# Patient Record
Sex: FEMALE | Race: BLACK OR AFRICAN AMERICAN | NOT HISPANIC OR LATINO | Employment: FULL TIME | URBAN - METROPOLITAN AREA
[De-identification: names, ages, dates, MRNs, and addresses within clinical notes are randomized per-mention and may not be internally consistent; named-entity substitution may affect disease eponyms.]

---

## 2023-03-13 PROBLEM — O99.613 CONSTIPATION DURING PREGNANCY IN THIRD TRIMESTER (HHS-HCC): Status: ACTIVE | Noted: 2023-03-13

## 2023-03-13 PROBLEM — K59.00 CONSTIPATION DURING PREGNANCY IN THIRD TRIMESTER (HHS-HCC): Status: ACTIVE | Noted: 2023-03-13

## 2023-03-13 PROBLEM — R63.5 WEIGHT GAIN: Status: ACTIVE | Noted: 2023-03-13

## 2023-03-13 PROBLEM — N64.4 BREAST PAIN, LEFT: Status: ACTIVE | Noted: 2023-03-13

## 2023-03-13 PROBLEM — O99.019 ANEMIA IN PREGNANCY (HHS-HCC): Status: ACTIVE | Noted: 2023-03-13

## 2023-03-13 PROBLEM — R11.0 NAUSEA IN ADULT: Status: ACTIVE | Noted: 2023-03-13

## 2023-03-13 PROBLEM — G89.29 CHRONIC RIGHT-SIDED LOW BACK PAIN WITHOUT SCIATICA: Status: ACTIVE | Noted: 2023-03-13

## 2023-03-13 PROBLEM — K80.20 CHOLELITHIASIS: Status: ACTIVE | Noted: 2023-03-13

## 2023-03-13 PROBLEM — M54.2 NECK PAIN: Status: ACTIVE | Noted: 2023-03-13

## 2023-03-13 PROBLEM — O35.8XX0 FAMILY HISTORIC RISK OF CONGENITAL ABNORMALITY (HHS-HCC): Status: ACTIVE | Noted: 2023-03-13

## 2023-03-13 PROBLEM — N91.2 AMENORRHEA: Status: ACTIVE | Noted: 2023-03-13

## 2023-03-13 PROBLEM — O21.9 NAUSEA AND VOMITING DURING PREGNANCY (HHS-HCC): Status: ACTIVE | Noted: 2023-03-13

## 2023-03-13 PROBLEM — O99.891 BACK PAIN IN PREGNANCY (HHS-HCC): Status: ACTIVE | Noted: 2023-03-13

## 2023-03-13 PROBLEM — J02.9 SORE THROAT: Status: ACTIVE | Noted: 2023-03-13

## 2023-03-13 PROBLEM — N63.10 BREAST MASS, RIGHT: Status: ACTIVE | Noted: 2023-03-13

## 2023-03-13 PROBLEM — N63.0 BREAST MASS IN FEMALE: Status: ACTIVE | Noted: 2023-03-13

## 2023-03-13 PROBLEM — M54.50 CHRONIC RIGHT-SIDED LOW BACK PAIN WITHOUT SCIATICA: Status: ACTIVE | Noted: 2023-03-13

## 2023-03-13 PROBLEM — O99.013 ANEMIA AFFECTING PREGNANCY IN THIRD TRIMESTER (HHS-HCC): Status: ACTIVE | Noted: 2023-03-13

## 2023-03-13 PROBLEM — R76.11 REACTION TO TUBERCULIN SKIN TEST WITHOUT ACTIVE TUBERCULOSIS: Status: ACTIVE | Noted: 2023-03-13

## 2023-03-13 PROBLEM — M54.9 BACK PAIN IN PREGNANCY (HHS-HCC): Status: ACTIVE | Noted: 2023-03-13

## 2023-03-13 PROBLEM — M25.511 RIGHT SHOULDER PAIN: Status: ACTIVE | Noted: 2023-03-13

## 2023-03-13 PROBLEM — O99.611 CONSTIPATION DURING PREGNANCY IN FIRST TRIMESTER (HHS-HCC): Status: ACTIVE | Noted: 2023-03-13

## 2023-03-13 PROBLEM — K59.00 CONSTIPATION DURING PREGNANCY IN FIRST TRIMESTER (HHS-HCC): Status: ACTIVE | Noted: 2023-03-13

## 2023-03-13 PROBLEM — M54.2 CERVICALGIA: Status: ACTIVE | Noted: 2023-03-13

## 2023-03-13 RX ORDER — SEMAGLUTIDE 1.34 MG/ML
0.25 INJECTION, SOLUTION SUBCUTANEOUS
COMMUNITY
Start: 2022-08-24 | End: 2023-03-17

## 2023-03-13 RX ORDER — ALBUTEROL SULFATE 90 UG/1
2 AEROSOL, METERED RESPIRATORY (INHALATION) EVERY 4 HOURS PRN
COMMUNITY
End: 2024-05-30 | Stop reason: WASHOUT

## 2023-03-13 RX ORDER — NORETHINDRONE 0.35 MG/1
1 TABLET ORAL DAILY
COMMUNITY
Start: 2020-12-21 | End: 2024-05-30 | Stop reason: WASHOUT

## 2023-03-17 ENCOUNTER — OFFICE VISIT (OUTPATIENT)
Dept: PRIMARY CARE | Facility: CLINIC | Age: 23
End: 2023-03-17
Payer: COMMERCIAL

## 2023-03-17 VITALS
HEART RATE: 85 BPM | WEIGHT: 201 LBS | DIASTOLIC BLOOD PRESSURE: 55 MMHG | BODY MASS INDEX: 31.48 KG/M2 | SYSTOLIC BLOOD PRESSURE: 106 MMHG

## 2023-03-17 DIAGNOSIS — D50.9 IRON DEFICIENCY ANEMIA, UNSPECIFIED IRON DEFICIENCY ANEMIA TYPE: ICD-10-CM

## 2023-03-17 DIAGNOSIS — Z84.81 FH: BRCA GENE POSITIVE: ICD-10-CM

## 2023-03-17 DIAGNOSIS — N64.52 NIPPLE DISCHARGE IN FEMALE: ICD-10-CM

## 2023-03-17 DIAGNOSIS — N64.4 BREAST PAIN, LEFT: Primary | ICD-10-CM

## 2023-03-17 LAB
ERYTHROCYTE DISTRIBUTION WIDTH (RATIO) BY AUTOMATED COUNT: 15.1 % (ref 11.5–14.5)
ERYTHROCYTE MEAN CORPUSCULAR HEMOGLOBIN CONCENTRATION (G/DL) BY AUTOMATED: 28.4 G/DL (ref 32–36)
ERYTHROCYTE MEAN CORPUSCULAR VOLUME (FL) BY AUTOMATED COUNT: 81 FL (ref 80–100)
ERYTHROCYTES (10*6/UL) IN BLOOD BY AUTOMATED COUNT: 4.06 X10E12/L (ref 4–5.2)
HEMATOCRIT (%) IN BLOOD BY AUTOMATED COUNT: 32.7 % (ref 36–46)
HEMOGLOBIN (G/DL) IN BLOOD: 9.3 G/DL (ref 12–16)
LEUKOCYTES (10*3/UL) IN BLOOD BY AUTOMATED COUNT: 6 X10E9/L (ref 4.4–11.3)
NRBC (PER 100 WBCS) BY AUTOMATED COUNT: 0 /100 WBC (ref 0–0)
PLATELETS (10*3/UL) IN BLOOD AUTOMATED COUNT: 385 X10E9/L (ref 150–450)

## 2023-03-17 PROCEDURE — 85027 COMPLETE CBC AUTOMATED: CPT

## 2023-03-17 PROCEDURE — 99213 OFFICE O/P EST LOW 20 MIN: CPT | Performed by: STUDENT IN AN ORGANIZED HEALTH CARE EDUCATION/TRAINING PROGRAM

## 2023-03-17 PROCEDURE — 83540 ASSAY OF IRON: CPT

## 2023-03-17 PROCEDURE — 82728 ASSAY OF FERRITIN: CPT

## 2023-03-17 PROCEDURE — 83550 IRON BINDING TEST: CPT

## 2023-03-17 PROCEDURE — 36415 COLL VENOUS BLD VENIPUNCTURE: CPT | Performed by: STUDENT IN AN ORGANIZED HEALTH CARE EDUCATION/TRAINING PROGRAM

## 2023-03-17 NOTE — PROGRESS NOTES
Subjective   Patient ID: Willie Ohara is a 22 y.o. female who presents for Breast Pain.  HPI  Patient presents with intense breast pain on left sided with nipple discharge ; milky discharge but not bloddy; no h/o trauma / feverNo  breast feeding   Last breast fed 1 year and 4 motnhs   No changes with menstrual cycles     No signs of infect    Review of Systems   Constitutional:  Negative for activity change and fever.   HENT:  Negative for congestion.    Respiratory:  Negative for cough, shortness of breath and wheezing.    Cardiovascular:  Negative for chest pain and leg swelling.   Gastrointestinal:  Negative for abdominal pain, constipation, nausea and vomiting.   Endocrine: Negative for cold intolerance.   Genitourinary:  Negative for dysuria, hematuria and urgency.   Neurological:  Negative for dizziness, speech difficulty, weakness and numbness.   Psychiatric/Behavioral:  Negative for self-injury and suicidal ideas.        Objective   Visit Vitals  /55   Pulse 85   Wt 91.2 kg (201 lb)   BMI 31.48 kg/m²   Smoking Status Never Assessed   BSA 2.08 m²      Physical Exam  HENT:      Head: Normocephalic and atraumatic.      Right Ear: Tympanic membrane normal.      Left Ear: Tympanic membrane normal.      Nose: Nose normal.      Mouth/Throat:      Mouth: Mucous membranes are moist.   Eyes:      Extraocular Movements: Extraocular movements intact.      Conjunctiva/sclera: Conjunctivae normal.      Pupils: Pupils are equal, round, and reactive to light.   Cardiovascular:      Rate and Rhythm: Normal rate and regular rhythm.      Pulses: Normal pulses.      Heart sounds: Normal heart sounds.   Pulmonary:      Effort: Pulmonary effort is normal.      Breath sounds: Normal breath sounds. No stridor. No rhonchi.   Chest:   Breasts:     Right: No bleeding, inverted nipple, mass, nipple discharge, skin change or tenderness.      Left: No bleeding, inverted nipple, mass, nipple discharge, skin change or tenderness.    Abdominal:      General: Bowel sounds are normal.      Palpations: Abdomen is soft.      Tenderness: There is no abdominal tenderness. There is no guarding or rebound.   Musculoskeletal:      Cervical back: Neck supple.   Lymphadenopathy:      Upper Body:      Right upper body: No axillary adenopathy.      Left upper body: No axillary adenopathy.   Neurological:      Mental Status: She is oriented to person, place, and time.   Psychiatric:         Mood and Affect: Mood normal.         Behavior: Behavior normal.         Assessment/Plan     Problem List Items Addressed This Visit          Other    Breast pain, left - Primary    Relevant Orders    BI US breast complete left    Referral to Breast Surgery    CBC (Completed)    Iron and TIBC (Completed)    Ferritin (Completed)     Other Visit Diagnoses       Nipple discharge in female        Relevant Orders    BI US breast complete left    Referral to Breast Surgery    CBC (Completed)    Iron and TIBC (Completed)    Ferritin (Completed)    FH: BRCA gene positive        Relevant Orders    Referral to Genetics    CBC (Completed)    Iron and TIBC (Completed)    Ferritin (Completed)    Iron deficiency anemia, unspecified iron deficiency anemia type        Relevant Orders    CBC (Completed)    Iron and TIBC (Completed)    Ferritin (Completed)           I am unsure of the cause at present.  We will get a breast ultrasound and will get breast surgeon referral.    Patient is also interested in Wegovy.  Other pharmacological nonpharmacological options discussed.  Medullary thyroid cancer discussed.  Other side effects including acute kidney injury and acute pancreatitis discussed.  Agreeable.  We will send prescription once we get the breast ultrasound.  Degree of      Wegovy   MTC risk discussed  Wants to get Br uSG done and then start  wegovy

## 2023-03-18 LAB
FERRITIN (UG/LL) IN SER/PLAS: 12 UG/L (ref 8–150)
IRON (UG/DL) IN SER/PLAS: 21 UG/DL (ref 35–150)
IRON BINDING CAPACITY (UG/DL) IN SER/PLAS: 448 UG/DL (ref 240–445)
IRON SATURATION (%) IN SER/PLAS: 5 % (ref 25–45)

## 2023-03-22 ENCOUNTER — TELEPHONE (OUTPATIENT)
Dept: PRIMARY CARE | Facility: CLINIC | Age: 23
End: 2023-03-22
Payer: COMMERCIAL

## 2023-03-22 DIAGNOSIS — D50.9 IRON DEFICIENCY ANEMIA, UNSPECIFIED IRON DEFICIENCY ANEMIA TYPE: Primary | ICD-10-CM

## 2023-03-22 RX ORDER — FERROUS SULFATE 325(65) MG
65 TABLET ORAL
Qty: 90 TABLET | Refills: 2 | Status: SHIPPED | OUTPATIENT
Start: 2023-03-22 | End: 2023-06-20

## 2023-03-22 RX ORDER — DOCUSATE SODIUM 100 MG/1
100 CAPSULE, LIQUID FILLED ORAL 3 TIMES DAILY PRN
Qty: 60 CAPSULE | Refills: 2 | Status: SHIPPED | OUTPATIENT
Start: 2023-03-22 | End: 2023-05-21

## 2023-03-22 NOTE — TELEPHONE ENCOUNTER
Result Communication    Resulted Orders   CBC   Result Value Ref Range    WBC 6.0 4.4 - 11.3 x10E9/L    nRBC 0.0 0.0 - 0.0 /100 WBC    RBC 4.06 4.00 - 5.20 x10E12/L    Hemoglobin 9.3 (L) 12.0 - 16.0 g/dL    Hematocrit 32.7 (L) 36.0 - 46.0 %    MCV 81 80 - 100 fL    MCHC 28.4 (L) 32.0 - 36.0 g/dL    Platelets 385 150 - 450 x10E9/L    RDW 15.1 (H) 11.5 - 14.5 %   Ferritin   Result Value Ref Range    Ferritin 12 8 - 150 ug/L   Iron and TIBC   Result Value Ref Range    Iron 21 (L) 35 - 150 ug/dL    TIBC 448 (H) 240 - 445 ug/dL    Iron Saturation 5 (L) 25 - 45 %       1:11 PM      Results were successfully communicated with the patient and they acknowledged their understanding.

## 2023-03-24 ENCOUNTER — TELEPHONE (OUTPATIENT)
Dept: PRIMARY CARE | Facility: CLINIC | Age: 23
End: 2023-03-24

## 2023-03-26 ASSESSMENT — ENCOUNTER SYMPTOMS
HEMATURIA: 0
VOMITING: 0
SPEECH DIFFICULTY: 0
NUMBNESS: 0
ACTIVITY CHANGE: 0
WHEEZING: 0
COUGH: 0
NAUSEA: 0
DYSURIA: 0
FEVER: 0
ABDOMINAL PAIN: 0
SHORTNESS OF BREATH: 0
WEAKNESS: 0
DIZZINESS: 0
CONSTIPATION: 0

## 2023-03-27 DIAGNOSIS — E66.9 CLASS 1 OBESITY WITHOUT SERIOUS COMORBIDITY WITH BODY MASS INDEX (BMI) OF 31.0 TO 31.9 IN ADULT, UNSPECIFIED OBESITY TYPE: Primary | ICD-10-CM

## 2023-03-27 RX ORDER — SEMAGLUTIDE 0.25 MG/.5ML
0.25 INJECTION, SOLUTION SUBCUTANEOUS
Qty: 2 ML | Refills: 0 | Status: SHIPPED | OUTPATIENT
Start: 2023-03-27 | End: 2023-06-13 | Stop reason: SDUPTHER

## 2023-03-27 NOTE — PROGRESS NOTES
Discussed wegovy   Discussed s/e including MTC , pancreatitis and MESSI  Verbalizes understanding and agreeable

## 2023-05-10 ENCOUNTER — TELEPHONE (OUTPATIENT)
Dept: PRIMARY CARE | Facility: CLINIC | Age: 23
End: 2023-05-10
Payer: COMMERCIAL

## 2023-05-10 NOTE — TELEPHONE ENCOUNTER
Result Communication    Resulted Orders   US limited breast    Narrative    Interpreted By:  CLARITA EPSTEIN MD and NADINE SNELL MD  MRN: [id number]  Patient Name: YAQUELIN GAITAN     STUDY:  BREAST ULTRASOUND;  4/3/2023 12:19 pm     ACCESSION NUMBER(S):  [id number]     ORDERING CLINICIAN:  IGNACIO HUGHES     INDICATION:  Nonfocal left breast pain and non spontaneous white left nipple  discharge. Family history of breast cancer.     COMPARISON:  None.     FINDINGS:  ULTRASOUND: Targeted ultrasound was performed of the left breast  subareolar region and area of the patient's inferior left breast pain  by a registered sonographer with elastography.     No sonographic abnormality is identified within left breast  subareolar region and inferior left breast at the area of pain. No  suspicious findings are present. The tissue is soft on elastography.       Impression    No targeted sonographic evidence of malignancy. No sonographic  abnormality within the area of the patient's left breast pain, nor  any abnormality within the left breast subareolar region. Recommend  continued clinical follow-up.     BI-RADS CATEGORY:     Category: 1 - Negative.  Recommendation: Clinical follow-up.     For any future breast imaging appointments, please call [phone]).           I personally reviewed the images/study and I agree with the findings  as stated. This study was interpreted at [facility], [place], [place].       3:44 PM    Attempted to call patient about labs;      Iron def anemia and normal breast USG   Left a Voice message  Awaiting a call back

## 2023-06-13 DIAGNOSIS — E66.9 CLASS 1 OBESITY WITHOUT SERIOUS COMORBIDITY WITH BODY MASS INDEX (BMI) OF 31.0 TO 31.9 IN ADULT, UNSPECIFIED OBESITY TYPE: ICD-10-CM

## 2023-06-13 RX ORDER — SEMAGLUTIDE 0.25 MG/.5ML
0.25 INJECTION, SOLUTION SUBCUTANEOUS
Qty: 2 ML | Refills: 0 | Status: SHIPPED | OUTPATIENT
Start: 2023-06-13 | End: 2024-05-30 | Stop reason: WASHOUT

## 2023-07-25 LAB
ABO GROUP (TYPE) IN BLOOD: NORMAL
ANTIBODY SCREEN: NORMAL
ERYTHROCYTE DISTRIBUTION WIDTH (RATIO) BY AUTOMATED COUNT: 17.5 % (ref 11.5–14.5)
ERYTHROCYTE MEAN CORPUSCULAR HEMOGLOBIN CONCENTRATION (G/DL) BY AUTOMATED: 29.2 G/DL (ref 32–36)
ERYTHROCYTE MEAN CORPUSCULAR VOLUME (FL) BY AUTOMATED COUNT: 83 FL (ref 80–100)
ERYTHROCYTES (10*6/UL) IN BLOOD BY AUTOMATED COUNT: 4.44 X10E12/L (ref 4–5.2)
HEMATOCRIT (%) IN BLOOD BY AUTOMATED COUNT: 36.7 % (ref 36–46)
HEMOGLOBIN (G/DL) IN BLOOD: 10.7 G/DL (ref 12–16)
LEUKOCYTES (10*3/UL) IN BLOOD BY AUTOMATED COUNT: 5.8 X10E9/L (ref 4.4–11.3)
NRBC (PER 100 WBCS) BY AUTOMATED COUNT: 0 /100 WBC (ref 0–0)
PLATELETS (10*3/UL) IN BLOOD AUTOMATED COUNT: 309 X10E9/L (ref 150–450)
RH FACTOR: NORMAL

## 2023-07-27 ENCOUNTER — HOSPITAL ENCOUNTER (OUTPATIENT)
Dept: DATA CONVERSION | Facility: HOSPITAL | Age: 23
End: 2023-07-27
Attending: SURGERY | Admitting: SURGERY
Payer: COMMERCIAL

## 2023-07-27 DIAGNOSIS — Z91.040 LATEX ALLERGY STATUS: ICD-10-CM

## 2023-07-27 DIAGNOSIS — D64.9 ANEMIA, UNSPECIFIED: ICD-10-CM

## 2023-07-27 DIAGNOSIS — Z30.2 ENCOUNTER FOR STERILIZATION: ICD-10-CM

## 2023-08-03 LAB
COMPLETE PATHOLOGY REPORT: NORMAL
CONVERTED CLINICAL DIAGNOSIS-HISTORY: NORMAL
CONVERTED FINAL DIAGNOSIS: NORMAL
CONVERTED FINAL REPORT PDF LINK TO COPY AND PASTE: NORMAL

## 2023-08-08 LAB
COMPLETE PATHOLOGY REPORT: NORMAL
CONVERTED CLINICAL DIAGNOSIS-HISTORY: NORMAL
CONVERTED FINAL DIAGNOSIS: NORMAL
CONVERTED FINAL REPORT PDF LINK TO COPY AND PASTE: NORMAL
CONVERTED GROSS DESCRIPTION: NORMAL

## 2023-09-29 VITALS — WEIGHT: 208.34 LBS | HEIGHT: 67 IN | BODY MASS INDEX: 32.7 KG/M2

## 2023-09-30 NOTE — H&P
History & Physical Reviewed:   Pregnant/Lactating:  ·  Are You Pregnant no   ·  Are You Currently Breastfeeding no     I have reviewed the History and Physical dated:  06-Jul-2023   History and Physical reviewed and relevant findings noted. Patient examined to review pertinent physical  findings.: No significant changes   Home Medications Reviewed: no changes noted   Allergies Reviewed: no changes noted       ERAS (Enhanced Recovery After Surgery):  ·  ERAS Patient: yes   ·  CPM/PAT Utilization: no   ·  Immunonutrition Recovery Drink Utilization: no   ·  Carbohydrate Supplement Drink Utilization: no     Consent:   COVID-19 Consent:  ·  COVID-19 Risk Consent Surgeon has reviewed key risks related to the risk of carlitos COVID-19 and if they contract COVID-19 what the risks are.     Attestation:   Note Completion:  I am a:  Resident/Fellow   Attending Attestation I saw and evaluated the patient.  I personally obtained the key and critical portions of the history and physical exam or was physically present for key and  critical portions performed by the resident/fellow. I reviewed the resident/fellow?s documentation and discussed the patient with the resident/fellow.  I agree with the resident/fellow?s medical decision making as documented in the note.     I personally evaluated the patient on 27-Jul-2023         Electronic Signatures:  Mandi Reagan (Resident))  (Signed 27-Jul-2023 06:39)   Authored: History & Physical Reviewed, ERAS, Consent,  Note Completion  Lyn Melo)  (Signed 01-Aug-2023 07:27)   Authored: Note Completion   Co-Signer: History & Physical Reviewed, ERAS, Consent, Note Completion      Last Updated: 01-Aug-2023 07:27 by Lyn Melo)

## 2023-09-30 NOTE — H&P
History of Present Illness:   Pregnant/Lactating:  ·  Are You Pregnant no (1)   ·  Are You Currently Breastfeeding no (1)     History Present Illness:  Reason for surgery: Permanent contraception   HPI:    24 yo   presents for Laparoscopic  Bilateral Salpingectomy for permanent contraception    AB Rh +  Hb:10.7      Ob Hx: NSVDx2 TABx1   Gyn Hx: menses q 21 days, last for 7 days, light bleeding, no hx of PAP, Used OCPs and Nexplanon  PMH: anemia, BMI: 31.9  PSH: L/S Cholecystectomy , Hernia repair (2 yo)  Fam Hx: Sister: HTN, Thyroid disease, GM: Thyroid disease, Maternal half sister: Chromosome anomaly  SH: No t/e/d            Allergies:        Allergies:  ·  NKDA :   ·  Latex : Rash    Home Medication Review:   Home Medications Reviewed: yes     Impression/Procedure:   ·  Impression and Planned Procedure: Desire for permanent contraception  Laparoscopic Bilateral Salpingectomy       ERAS (Enhanced Recovery After Surgery):  ·  ERAS Patient: no       Physical Exam by System:    Constitutional: A&Ox3   Eyes: non icteric   ENMT: mucous membranes moist   Head/Neck: NCAT   Respiratory/Thorax: Breathing well on room air   Cardiovascular: Warm, well perfused   Musculoskeletal: Full ROM   Neurological: Grossly intact   Psychological: Appropriate affect   Skin: Warm and dry     Consent:   COVID-19 Consent:  ·  COVID-19 Risk Consent Surgeon has reviewed key risks related to the risk of carlitos COVID-19 and if they contract COVID-19 what the risks are.     Attestation:   Note Completion:  I am a:  Resident/Fellow   Attending Attestation I saw and evaluated the patient.  I personally obtained the key and critical portions of the history and physical exam or was physically present for key and  critical portions performed by the resident/fellow. I reviewed the resident/fellow?s documentation and discussed the patient with the resident/fellow.  I agree with the resident/fellow?s medical decision making as  "documented in the note.     I personally evaluated the patient on 27-Jul-2023         Electronic Signatures:  Arlen Woodward (Resident))  (Signed 27-Jul-2023 08:25)   Authored: History of Present Illness, Allergies, Home  Medication Review, Impression/Procedure, ERAS, Physical Exam, Consent, Note Completion  Lny Melo)  (Signed 01-Aug-2023 07:26)   Authored: Note Completion   Co-Signer: History of Present Illness, Allergies, Home Medication Review, Impression/Procedure, ERAS, Physical Exam, Consent, Note Completion      Last Updated: 01-Aug-2023 07:26 by Lyn Melo)    References:  1.  Data Referenced From \"History and Physical - Surgical Update < 30 days\" 27-Jul-2023 06:38   "

## 2023-10-02 NOTE — OP NOTE
Post Operative Note:     PreOp Diagnosis: multiparity, desires permanent contraception   Post-Procedure Diagnosis: same   Procedure: 1. laparoscopic bilateral Salpingectomy   Surgeon: Dr Melo   Resident/Fellow/Other Assistant: Arlen Woodward/ Mandi Reagan   Anesthesia: general ET   I.V. Fluids: 600   Estimated Blood Loss (mL): 5   Specimen: yes. Bilateral Fallopian tubes   Complications: None   Findings: Normal bilateral fallopian tubes,  ovaries,  uterus; normal pelvic and abdominal survey; normal ant abd wall   Patient Returned To/Condition: PACU/Stable   Urine Output: 125     Operative Report Dictated:  Dictation: not applicable - note contains Operative  Report   Operative Report:      Consent was completed in pre-op holding area after discussing all risks/benefits/alternatives.     Patient was taken to OR with IV in place. Team huddle and timeout were performed with all appropriate team members.  Anesthesia was induced without difficulty. Pt was placed in dorsal lithotomy position in sandra stirrups with her arms carefully tucked.   Bilateral sequential devices were placed on the lower extremities. She was prepped and draped in the usual sterile fashion.  Escobedo catheter was placed.  A speculum was inserted and cervix was visualized. A ioSafelka uterine manipulator was placed.     Attention was then turned to the abdomen. The abdomen was entered at the umbilicus using open Ramon technique and a 10mm Ramon port was placed. Correct intraabdominal placement was confirmed with the laparoscope and the abdomen was then insufflated  to 18 mm Hg. The point of entry was carefully studied and no vascular or visceral injury was present. A survey of the upper abdomen demonstrated normal anatomy. The patient was placed in steep trendelenburg position. A 5 mm port was placed on the right  lower quadrant under direct visualization; the same procedure was repeated on the left side.     Bilateral ureters were visualized.  The  right fallopian tube was stabilized using a grasper and a Ligasure device was used to dissect the tube away from the mesosalpinx and the ovary, and was also used to come across the tubal insertion into the uterus  at the cornu. The operative site was examined and found to be hemostatic and the tube was removed under direct visualization. The same procedure was repeated on the left side with good hemostasis noted. With insufflation pressure decreased below 8mm Hg,  all sites remained hemostatic.     All instruments and trocars were removed under direct visualization.  The umbilical port was closed using one additional figure of eight suture with 0-vicryl. All port sites were closed with 4-0 vicryl.     All sponge, lap, and needle counts were correct x2.    The rocha catheter was removed and the patient was taken to PACU in stable condition. Dr. Melo was presented for the entire procedure.      Attestation:   Note Completion:  I am a: Resident/Fellow   Attending Attestation I was present for the entire procedure          Electronic Signatures:  Arlen Woodward (Resident))  (Signed 27-Jul-2023 18:10)   Authored: Post Operative Note, Note Completion  Lyn Melo)  (Signed 11-Aug-2023 16:12)   Authored: Post Operative Note, Note Completion   Co-Signer: Post Operative Note, Note Completion      Last Updated: 11-Aug-2023 16:12 by Lyn Melo)

## 2023-10-30 ENCOUNTER — APPOINTMENT (OUTPATIENT)
Dept: OBSTETRICS AND GYNECOLOGY | Facility: CLINIC | Age: 23
End: 2023-10-30
Payer: COMMERCIAL

## 2023-11-27 ENCOUNTER — LAB (OUTPATIENT)
Dept: LAB | Facility: LAB | Age: 23
End: 2023-11-27
Payer: COMMERCIAL

## 2023-11-27 ENCOUNTER — OFFICE VISIT (OUTPATIENT)
Dept: OBSTETRICS AND GYNECOLOGY | Facility: CLINIC | Age: 23
End: 2023-11-27
Payer: COMMERCIAL

## 2023-11-27 VITALS
HEART RATE: 100 BPM | SYSTOLIC BLOOD PRESSURE: 132 MMHG | WEIGHT: 209 LBS | BODY MASS INDEX: 32.76 KG/M2 | DIASTOLIC BLOOD PRESSURE: 56 MMHG

## 2023-11-27 DIAGNOSIS — R87.619 ATYPICAL ENDOCERVICAL CELLS ON PAP SMEAR: Primary | ICD-10-CM

## 2023-11-27 PROCEDURE — 3008F BODY MASS INDEX DOCD: CPT | Performed by: OBSTETRICS & GYNECOLOGY

## 2023-11-27 PROCEDURE — 57454 BX/CURETT OF CERVIX W/SCOPE: CPT | Performed by: OBSTETRICS & GYNECOLOGY

## 2023-11-27 PROCEDURE — 88305 TISSUE EXAM BY PATHOLOGIST: CPT | Mod: TC,SUR | Performed by: OBSTETRICS & GYNECOLOGY

## 2023-11-27 PROCEDURE — 88305 TISSUE EXAM BY PATHOLOGIST: CPT | Performed by: STUDENT IN AN ORGANIZED HEALTH CARE EDUCATION/TRAINING PROGRAM

## 2023-11-27 PROCEDURE — 99213 OFFICE O/P EST LOW 20 MIN: CPT | Performed by: OBSTETRICS & GYNECOLOGY

## 2023-11-27 ASSESSMENT — ENCOUNTER SYMPTOMS
RESPIRATORY NEGATIVE: 0
PSYCHIATRIC NEGATIVE: 0
CONSTITUTIONAL NEGATIVE: 0
NEUROLOGICAL NEGATIVE: 0
CARDIOVASCULAR NEGATIVE: 0
HEMATOLOGIC/LYMPHATIC NEGATIVE: 0
ENDOCRINE NEGATIVE: 0
GASTROINTESTINAL NEGATIVE: 0
EYES NEGATIVE: 0
MUSCULOSKELETAL NEGATIVE: 0
ALLERGIC/IMMUNOLOGIC NEGATIVE: 0

## 2023-11-27 ASSESSMENT — PAIN SCALES - GENERAL: PAINLEVEL: 0-NO PAIN

## 2023-11-30 DIAGNOSIS — O99.013 ANEMIA AFFECTING PREGNANCY IN THIRD TRIMESTER (HHS-HCC): ICD-10-CM

## 2023-12-05 LAB
LABORATORY COMMENT REPORT: NORMAL
PATH REPORT.FINAL DX SPEC: NORMAL
PATH REPORT.GROSS SPEC: NORMAL
PATH REPORT.RELEVANT HX SPEC: NORMAL
PATH REPORT.TOTAL CANCER: NORMAL

## 2024-01-01 NOTE — PROGRESS NOTES
Here for colposcopy after last Pap atypical endocervical cells  h/o dyplasia:  No  h/o conization: No    HPV vaccine:  Yes    tobacco use: No    contraception:  TL          /56   Pulse 100   Wt 94.8 kg (209 lb)   LMP 11/07/2023   BMI 32.76 kg/m²     General Appearance:    Alert, cooperative, no distress, appears stated age   Head:    Normocephalic, without obvious abnormality, atraumatic           Nose:   Nares normal, septum midline, mucosa normal, no drainage or sinus    tenderness   Throat:   Lips, mucosa, and tongue normal; teeth and gums normal   Neck:   Supple, symmetrical, trachea midline, no adenopathy; thyroid: no         enlargement/tenderness/nodules; no carotid bruit or JVD   Back:     Symmetric, no curvature, ROM normal, no CVA tenderness           Heart:    Regular rate and rhythm, S1 and S2 normal, no murmur, rub or gallop           Genitalia:    Normal  genitalia without lesion, discharge or tenderness       Extremities:   Extremities normal, atraumatic, no cyanosis or edema                       Colposcopy Procedure Note    Procedure Details   The risks and benefits of the procedure and Written informed consent obtained.    Speculum placed in vagina and excellent visualization of cervix achieved, cervix swabbed x 3 with acetic acid solution.    Findings:  Cervix: acetowhite lesion(s) noted at 1 o'clock; endocervical curettage performed and cervical biopsies taken at 1 o'clock.            Complications:  Ms. Ohara had significant discomfort with the exam/procedure and significant apprehension about the procedure even before it started .    Plan:  Specimens labelled and sent to Pathology.  Will base further treatment on Pathology findings.  If needs LEEP, should be performed in OR due to patient's discomfort/anxiety.

## 2024-01-24 ENCOUNTER — APPOINTMENT (OUTPATIENT)
Dept: RADIOLOGY | Facility: HOSPITAL | Age: 24
End: 2024-01-24
Payer: COMMERCIAL

## 2024-01-24 ENCOUNTER — HOSPITAL ENCOUNTER (EMERGENCY)
Facility: HOSPITAL | Age: 24
Discharge: HOME | End: 2024-01-24
Payer: COMMERCIAL

## 2024-01-24 VITALS
TEMPERATURE: 98 F | HEART RATE: 80 BPM | WEIGHT: 200 LBS | RESPIRATION RATE: 16 BRPM | DIASTOLIC BLOOD PRESSURE: 57 MMHG | SYSTOLIC BLOOD PRESSURE: 112 MMHG | HEIGHT: 67 IN | BODY MASS INDEX: 31.39 KG/M2 | OXYGEN SATURATION: 100 %

## 2024-01-24 DIAGNOSIS — M25.561 ACUTE PAIN OF RIGHT KNEE: Primary | ICD-10-CM

## 2024-01-24 DIAGNOSIS — M54.31 SCIATICA OF RIGHT SIDE: ICD-10-CM

## 2024-01-24 PROCEDURE — 93971 EXTREMITY STUDY: CPT

## 2024-01-24 PROCEDURE — 73564 X-RAY EXAM KNEE 4 OR MORE: CPT | Mod: RIGHT SIDE | Performed by: STUDENT IN AN ORGANIZED HEALTH CARE EDUCATION/TRAINING PROGRAM

## 2024-01-24 PROCEDURE — 99284 EMERGENCY DEPT VISIT MOD MDM: CPT | Mod: 25 | Performed by: PHYSICIAN ASSISTANT

## 2024-01-24 PROCEDURE — 73564 X-RAY EXAM KNEE 4 OR MORE: CPT | Mod: RT

## 2024-01-24 PROCEDURE — 93970 EXTREMITY STUDY: CPT | Performed by: STUDENT IN AN ORGANIZED HEALTH CARE EDUCATION/TRAINING PROGRAM

## 2024-01-24 PROCEDURE — 99283 EMERGENCY DEPT VISIT LOW MDM: CPT

## 2024-01-24 PROCEDURE — 99284 EMERGENCY DEPT VISIT MOD MDM: CPT | Performed by: PHYSICIAN ASSISTANT

## 2024-01-24 RX ORDER — NAPROXEN 500 MG/1
500 TABLET ORAL
Qty: 14 TABLET | Refills: 0 | Status: SHIPPED | OUTPATIENT
Start: 2024-01-24 | End: 2024-01-31

## 2024-01-24 RX ORDER — PREDNISONE 20 MG/1
40 TABLET ORAL DAILY
Qty: 10 TABLET | Refills: 0 | Status: SHIPPED | OUTPATIENT
Start: 2024-01-24 | End: 2024-01-29

## 2024-01-24 ASSESSMENT — PAIN SCALES - GENERAL: PAINLEVEL_OUTOF10: 0 - NO PAIN

## 2024-01-24 ASSESSMENT — PAIN - FUNCTIONAL ASSESSMENT: PAIN_FUNCTIONAL_ASSESSMENT: 0-10

## 2024-01-24 ASSESSMENT — COLUMBIA-SUICIDE SEVERITY RATING SCALE - C-SSRS
2. HAVE YOU ACTUALLY HAD ANY THOUGHTS OF KILLING YOURSELF?: NO
6. HAVE YOU EVER DONE ANYTHING, STARTED TO DO ANYTHING, OR PREPARED TO DO ANYTHING TO END YOUR LIFE?: NO
1. IN THE PAST MONTH, HAVE YOU WISHED YOU WERE DEAD OR WISHED YOU COULD GO TO SLEEP AND NOT WAKE UP?: NO

## 2024-01-24 NOTE — ED TRIAGE NOTES
P tpresents to ed for intermittent R knee pn and instability for 10 days. Pt denies any injuries or provoking events. Pt is Aox3 and ambulatory.

## 2024-01-25 NOTE — ED PROVIDER NOTES
"HPI   Chief Complaint   Patient presents with    Knee Injury     Instability       Patient is a 23-year-old previously healthy female who presents today for evaluation of right posterior knee pain and shooting pains down the right leg.  Patient states when she was pregnant she had sciatica, she states that about 3 days ago she started having some sciatica pain, radiates down her right buttock, states it is very mild.  What concerns her is that for the last 9 days she has had atraumatic right knee pain, she states it feels like there is a \"bubble\" behind her knee.  She states it is worse with kneeling onto the knee or movement.  She also states is worse with pressing on the gas.  Denies calf pain or swelling, denies recent surgeries hospitalizations or travel, denies birth control or chance of pregnancy, no chest pain or shortness of breath.  Patient is not sure what could be causing this.  She states that occasionally it feels like her right leg is going to give out from under her and feels weak.  She states that the weakness in the right knee preceded the sciatica pain.  History of diabetes.  Denies any falls injuries or traumas.  Denies doing anything strenuous before the symptoms started.                          [place] Coma Scale Score: 15                  Patient History   Past Medical History:   Diagnosis Date    Encounter for screening for infections with a predominantly sexual mode of transmission 07/17/2018    Screening for STDs (sexually transmitted diseases)    Encounter for supervision of other normal pregnancy, first trimester 04/10/2020    Prenatal care, subsequent pregnancy in first trimester    Encounter for surveillance of injectable contraceptive 05/06/2019    Encounter for Depo-Provera contraception    Nausea 12/11/2019    Nausea in adult    Other conditions influencing health status 04/09/2014    No significant past medical history    Other problems related to lifestyle 11/07/2018    Other " problems related to lifestyle    Personal history of other diseases of the respiratory system 12/11/2019    History of sore throat    Personal history of other infectious and parasitic diseases 12/11/2019    History of viral infection    Personal history of other infectious and parasitic diseases 04/13/2017    History of Nestor-Barr virus infection    Personal history of other infectious and parasitic diseases 01/25/2018    History of trichomonal vaginitis    Personal history of other specified conditions 12/11/2019    History of abdominal pain    Personal history of other specified conditions 03/20/2017    History of abdominal pain    Persons encountering health services in other specified circumstances 08/20/2019    Encounter to establish care    Unequal limb length (acquired), unspecified site 04/24/2014    Leg length discrepancy     Past Surgical History:   Procedure Laterality Date    UMBILICAL HERNIA REPAIR  09/14/2015    Umbilical Hernia Repair     Family History   Problem Relation Name Age of Onset    Hypertension Sister      Thyroid disease Sister      Thyroid disease Maternal Grandmother      Chromosomal disorder Other Mat halfsister      Social History     Tobacco Use    Smoking status: Never    Smokeless tobacco: Never   Substance Use Topics    Alcohol use: Not on file    Drug use: Not on file       Physical Exam   ED Triage Vitals [01/24/24 1742]   Temperature Heart Rate Respirations BP   36.7 °C (98.1 °F) 86 15 125/74      Pulse Ox Temp Source Heart Rate Source Patient Position   100 % Tympanic Monitor --      BP Location FiO2 (%)     -- --       Physical Exam  Vitals and nursing note reviewed.   Constitutional:       General: She is not in acute distress.     Appearance: Normal appearance. She is not toxic-appearing.   HENT:      Head: Normocephalic and atraumatic.      Nose: Nose normal.   Eyes:      Extraocular Movements: Extraocular movements intact.   Cardiovascular:      Rate and Rhythm: Normal  rate and regular rhythm.   Pulmonary:      Effort: Pulmonary effort is normal.   Abdominal:      Palpations: Abdomen is soft.   Musculoskeletal:         General: Normal range of motion.      Cervical back: Normal range of motion and neck supple.      Comments: No right knee tenderness to palpation, no pain posteriorly to the knee, full flexion extension, no laxity with valgus varus stress, distally neurovascularly intact.  Negative Heidy.     Skin:     General: Skin is warm and dry.   Neurological:      General: No focal deficit present.      Mental Status: She is alert.   Psychiatric:         Mood and Affect: Mood normal.         Thought Content: Thought content normal.       Lower extremity venous duplex right   Final Result   Limited visualization of the right peroneal vein due to calf edema.   Otherwise, no sonographic evidence for deep vein thrombosis within   the evaluated veins of the right lower extremity..        I personally reviewed the images/study and I agree with the findings   as stated. This study was interpreted at [place], [place].        MACRO:   None        Signed by: Germán Hollis 1/24/2024 8:43 PM   Dictation workstation:   [id number]      XR knee right 4+ views   Final Result   1. No acute fracture or malalignment without significant degenerative   change.   2. Small suprapatellar knee joint effusion.        I personally reviewed the images/study and I agree with the findings   as stated by Fredo Thompson MD. This study was interpreted at   [place], [place]        MACRO:   None.        Signed by: Germán Hollis 1/24/2024 7:50 PM   Dictation workstation:   [id number]        Labs Reviewed - No data to display      ED Course & MDM   Diagnoses as of 01/24/24 2231   Acute pain of right knee   Sciatica of right side       Medical Decision Making    MDM: Patient is a 23-year-old female presents today  for evaluation of right knee pain, she is also having some symptoms of right-sided sciatica, the knee pain and weakness in the knee preceded the sciatica symptoms.  She has pain both posteriorly behind the knee, with it being atraumatic and more soft tissue related, I was concerned about a possible DVT and ordered a right lower extremity ultrasound in addition to x-ray of the right knee. Ultrasound was a limited evaluation of the right peroneal vein however there is no sonographic evidence for DVT, x-ray showed a knee effusion but no bony abnormality.  Patient denies any chance of pregnancy, will treat with NSAID and prednisone for her right-sided sciatica, in the event of no improvement will give her sports medicine follow-up.        Procedure  Procedures     Paulette Chan PA-C  01/24/24 1630

## 2024-02-28 ENCOUNTER — LAB (OUTPATIENT)
Dept: LAB | Facility: LAB | Age: 24
End: 2024-02-28
Payer: COMMERCIAL

## 2024-02-28 DIAGNOSIS — D50.9 IRON DEFICIENCY ANEMIA, UNSPECIFIED IRON DEFICIENCY ANEMIA TYPE: ICD-10-CM

## 2024-02-28 LAB
BASOPHILS # BLD AUTO: 0.03 X10*3/UL (ref 0–0.1)
BASOPHILS NFR BLD AUTO: 0.4 %
EOSINOPHIL # BLD AUTO: 0.16 X10*3/UL (ref 0–0.7)
EOSINOPHIL NFR BLD AUTO: 2.3 %
ERYTHROCYTE [DISTWIDTH] IN BLOOD BY AUTOMATED COUNT: 13.5 % (ref 11.5–14.5)
FERRITIN SERPL-MCNC: 51 NG/ML (ref 8–150)
HCT VFR BLD AUTO: 41.7 % (ref 36–46)
HGB BLD-MCNC: 12.5 G/DL (ref 12–16)
IMM GRANULOCYTES # BLD AUTO: 0.02 X10*3/UL (ref 0–0.7)
IMM GRANULOCYTES NFR BLD AUTO: 0.3 % (ref 0–0.9)
IRON SATN MFR SERPL: 15 % (ref 25–45)
IRON SERPL-MCNC: 52 UG/DL (ref 35–150)
LYMPHOCYTES # BLD AUTO: 1.92 X10*3/UL (ref 1.2–4.8)
LYMPHOCYTES NFR BLD AUTO: 27 %
MCH RBC QN AUTO: 25.7 PG (ref 26–34)
MCHC RBC AUTO-ENTMCNC: 30 G/DL (ref 32–36)
MCV RBC AUTO: 86 FL (ref 80–100)
MONOCYTES # BLD AUTO: 0.41 X10*3/UL (ref 0.1–1)
MONOCYTES NFR BLD AUTO: 5.8 %
NEUTROPHILS # BLD AUTO: 4.56 X10*3/UL (ref 1.2–7.7)
NEUTROPHILS NFR BLD AUTO: 64.2 %
NRBC BLD-RTO: 0 /100 WBCS (ref 0–0)
PLATELET # BLD AUTO: 342 X10*3/UL (ref 150–450)
RBC # BLD AUTO: 4.86 X10*6/UL (ref 4–5.2)
TIBC SERPL-MCNC: 337 UG/DL (ref 240–445)
UIBC SERPL-MCNC: 285 UG/DL (ref 110–370)
WBC # BLD AUTO: 7.1 X10*3/UL (ref 4.4–11.3)

## 2024-02-28 PROCEDURE — 83540 ASSAY OF IRON: CPT

## 2024-02-28 PROCEDURE — 36415 COLL VENOUS BLD VENIPUNCTURE: CPT

## 2024-02-28 PROCEDURE — 82728 ASSAY OF FERRITIN: CPT

## 2024-02-28 PROCEDURE — 85025 COMPLETE CBC W/AUTO DIFF WBC: CPT

## 2024-02-28 PROCEDURE — 83550 IRON BINDING TEST: CPT

## 2024-03-09 ENCOUNTER — HOSPITAL ENCOUNTER (EMERGENCY)
Facility: HOSPITAL | Age: 24
Discharge: HOME | End: 2024-03-09
Payer: COMMERCIAL

## 2024-03-09 VITALS
OXYGEN SATURATION: 100 % | BODY MASS INDEX: 32.8 KG/M2 | HEIGHT: 67 IN | TEMPERATURE: 100.5 F | HEART RATE: 99 BPM | WEIGHT: 209 LBS | SYSTOLIC BLOOD PRESSURE: 119 MMHG | DIASTOLIC BLOOD PRESSURE: 78 MMHG | RESPIRATION RATE: 20 BRPM

## 2024-03-09 DIAGNOSIS — N30.00 ACUTE CYSTITIS WITHOUT HEMATURIA: Primary | ICD-10-CM

## 2024-03-09 LAB
AMORPH CRY #/AREA UR COMP ASSIST: ABNORMAL /HPF
APPEARANCE UR: ABNORMAL
BACTERIA #/AREA URNS AUTO: ABNORMAL /HPF
BILIRUB UR STRIP.AUTO-MCNC: NEGATIVE MG/DL
COLOR UR: ABNORMAL
FLUAV RNA RESP QL NAA+PROBE: NOT DETECTED
FLUBV RNA RESP QL NAA+PROBE: NOT DETECTED
GLUCOSE UR STRIP.AUTO-MCNC: NEGATIVE MG/DL
HCG UR QL IA.RAPID: NEGATIVE
KETONES UR STRIP.AUTO-MCNC: NEGATIVE MG/DL
LEUKOCYTE ESTERASE UR QL STRIP.AUTO: ABNORMAL
MUCOUS THREADS #/AREA URNS AUTO: ABNORMAL /LPF
NITRITE UR QL STRIP.AUTO: NEGATIVE
PH UR STRIP.AUTO: 6 [PH]
PROT UR STRIP.AUTO-MCNC: ABNORMAL MG/DL
RBC # UR STRIP.AUTO: NEGATIVE /UL
RBC #/AREA URNS AUTO: ABNORMAL /HPF
SARS-COV-2 RNA RESP QL NAA+PROBE: NOT DETECTED
SP GR UR STRIP.AUTO: 1.02
SQUAMOUS #/AREA URNS AUTO: ABNORMAL /HPF
UROBILINOGEN UR STRIP.AUTO-MCNC: 2 MG/DL
WBC #/AREA URNS AUTO: ABNORMAL /HPF
WBC CLUMPS #/AREA URNS AUTO: ABNORMAL /HPF

## 2024-03-09 PROCEDURE — 2500000002 HC RX 250 W HCPCS SELF ADMINISTERED DRUGS (ALT 637 FOR MEDICARE OP, ALT 636 FOR OP/ED): Performed by: PHYSICIAN ASSISTANT

## 2024-03-09 PROCEDURE — 87086 URINE CULTURE/COLONY COUNT: CPT | Mod: AHULAB | Performed by: PHYSICIAN ASSISTANT

## 2024-03-09 PROCEDURE — 81001 URINALYSIS AUTO W/SCOPE: CPT | Performed by: EMERGENCY MEDICINE

## 2024-03-09 PROCEDURE — 87636 SARSCOV2 & INF A&B AMP PRB: CPT | Performed by: EMERGENCY MEDICINE

## 2024-03-09 PROCEDURE — 81025 URINE PREGNANCY TEST: CPT | Performed by: EMERGENCY MEDICINE

## 2024-03-09 PROCEDURE — 99283 EMERGENCY DEPT VISIT LOW MDM: CPT

## 2024-03-09 PROCEDURE — 2500000001 HC RX 250 WO HCPCS SELF ADMINISTERED DRUGS (ALT 637 FOR MEDICARE OP): Performed by: PHYSICIAN ASSISTANT

## 2024-03-09 RX ORDER — IBUPROFEN 600 MG/1
600 TABLET ORAL ONCE
Status: COMPLETED | OUTPATIENT
Start: 2024-03-09 | End: 2024-03-09

## 2024-03-09 RX ORDER — ACETAMINOPHEN 325 MG/1
650 TABLET ORAL ONCE
Status: COMPLETED | OUTPATIENT
Start: 2024-03-09 | End: 2024-03-09

## 2024-03-09 RX ORDER — NITROFURANTOIN 25; 75 MG/1; MG/1
100 CAPSULE ORAL 2 TIMES DAILY
Qty: 10 CAPSULE | Refills: 0 | Status: SHIPPED | OUTPATIENT
Start: 2024-03-09 | End: 2024-03-14

## 2024-03-09 RX ORDER — NITROFURANTOIN 25; 75 MG/1; MG/1
100 CAPSULE ORAL ONCE
Status: COMPLETED | OUTPATIENT
Start: 2024-03-09 | End: 2024-03-09

## 2024-03-09 RX ADMIN — ACETAMINOPHEN 650 MG: 325 TABLET ORAL at 10:24

## 2024-03-09 RX ADMIN — IBUPROFEN 600 MG: 600 TABLET, FILM COATED ORAL at 10:24

## 2024-03-09 RX ADMIN — NITROFURANTOIN MONOHYDRATE/MACROCRYSTALS 100 MG: 25; 75 CAPSULE ORAL at 10:24

## 2024-03-09 ASSESSMENT — PAIN DESCRIPTION - LOCATION: LOCATION: GENERALIZED

## 2024-03-09 ASSESSMENT — PAIN DESCRIPTION - PAIN TYPE: TYPE: ACUTE PAIN

## 2024-03-09 ASSESSMENT — PAIN - FUNCTIONAL ASSESSMENT: PAIN_FUNCTIONAL_ASSESSMENT: 0-10

## 2024-03-09 ASSESSMENT — PAIN DESCRIPTION - DESCRIPTORS: DESCRIPTORS: ACHING

## 2024-03-09 NOTE — ED PROVIDER NOTES
HPI   Chief Complaint   Patient presents with    Fever    Generalized Body Aches       23-year-old female with onset of illness yesterday.  Myalgias headache fever nausea without vomiting diarrhea.  Has had urinary urgency frequency no dysuria hematuria.  She has had a mild cough.  No sore throat.  She is on no prescription medicines.  No allergies.      History provided by:  Patient   used: No                        Denise Coma Scale Score: 15                     Patient History   Past Medical History:   Diagnosis Date    Encounter for screening for infections with a predominantly sexual mode of transmission 07/17/2018    Screening for STDs (sexually transmitted diseases)    Encounter for supervision of other normal pregnancy, first trimester 04/10/2020    Prenatal care, subsequent pregnancy in first trimester    Encounter for surveillance of injectable contraceptive 05/06/2019    Encounter for Depo-Provera contraception    Nausea 12/11/2019    Nausea in adult    Other conditions influencing health status 04/09/2014    No significant past medical history    Other problems related to lifestyle 11/07/2018    Other problems related to lifestyle    Personal history of other diseases of the respiratory system 12/11/2019    History of sore throat    Personal history of other infectious and parasitic diseases 12/11/2019    History of viral infection    Personal history of other infectious and parasitic diseases 04/13/2017    History of Nestor-Barr virus infection    Personal history of other infectious and parasitic diseases 01/25/2018    History of trichomonal vaginitis    Personal history of other specified conditions 12/11/2019    History of abdominal pain    Personal history of other specified conditions 03/20/2017    History of abdominal pain    Persons encountering health services in other specified circumstances 08/20/2019    Encounter to establish care    Unequal limb length (acquired),  unspecified site 04/24/2014    Leg length discrepancy     Past Surgical History:   Procedure Laterality Date    UMBILICAL HERNIA REPAIR  09/14/2015    Umbilical Hernia Repair     Family History   Problem Relation Name Age of Onset    Hypertension Sister      Thyroid disease Sister      Thyroid disease Maternal Grandmother      Chromosomal disorder Other Mat halfsister      Social History     Tobacco Use    Smoking status: Never    Smokeless tobacco: Never   Substance Use Topics    Alcohol use: Not on file    Drug use: Not on file       Physical Exam   ED Triage Vitals [03/09/24 0857]   Temperature Heart Rate Respirations BP   (!) 38.3 °C (100.9 °F) (!) 102 20 131/75      Pulse Ox Temp Source Heart Rate Source Patient Position   99 % Oral -- --      BP Location FiO2 (%)     -- --       Physical Exam  Vitals and nursing note reviewed.   Constitutional:       General: She is not in acute distress.     Appearance: Normal appearance. She is normal weight. She is not ill-appearing, toxic-appearing or diaphoretic.   HENT:      Head: Normocephalic and atraumatic.      Nose: Nose normal.      Mouth/Throat:      Mouth: Mucous membranes are moist.      Pharynx: Oropharynx is clear. No oropharyngeal exudate or posterior oropharyngeal erythema.   Eyes:      Extraocular Movements: Extraocular movements intact.      Conjunctiva/sclera: Conjunctivae normal.      Pupils: Pupils are equal, round, and reactive to light.   Cardiovascular:      Rate and Rhythm: Normal rate and regular rhythm.   Pulmonary:      Effort: Pulmonary effort is normal.   Abdominal:      Palpations: Abdomen is soft.      Tenderness: There is no abdominal tenderness. There is no right CVA tenderness or left CVA tenderness.   Musculoskeletal:         General: Normal range of motion.      Cervical back: Normal range of motion and neck supple. No rigidity or tenderness.   Skin:     General: Skin is warm and dry.   Neurological:      General: No focal deficit  present.      Mental Status: She is alert and oriented to person, place, and time.   Psychiatric:         Mood and Affect: Mood normal.         Behavior: Behavior normal.         Thought Content: Thought content normal.         Judgment: Judgment normal.         ED Course & MDM   ED Course as of 03/09/24 1116   Sat Mar 09, 2024   1006 COVID influenza not detected.  Not pregnant.  Urinalysis discerning for infection with white cells clumps and bacteria negative nitrates.  Not pregnant [GA]      ED Course User Index  [GA] Gray Eaton PA-C         Diagnoses as of 03/09/24 1116   Acute cystitis without hematuria     Labs Reviewed   URINALYSIS WITH REFLEX MICROSCOPIC - Abnormal       Result Value    Color, Urine Liz (*)     Appearance, Urine Cloudy (*)     Specific Gravity, Urine 1.024      pH, Urine 6.0      Protein, Urine >=500 (3+) (*)     Glucose, Urine NEGATIVE      Blood, Urine NEGATIVE      Ketones, Urine NEGATIVE      Bilirubin, Urine NEGATIVE      Urobilinogen, Urine 2.0 (*)     Nitrite, Urine NEGATIVE      Leukocyte Esterase, Urine TRACE (*)    MICROSCOPIC ONLY, URINE - Abnormal    WBC, Urine 21-50 (*)     WBC Clumps, Urine FEW      RBC, Urine 11-20 (*)     Squamous Epithelial Cells, Urine 51-75 (2+)      Bacteria, Urine 1+ (*)     Mucus, Urine 4+      Amorphous Crystals, Urine 3+ (*)    SARS-COV-2 AND INFLUENZA A/B PCR - Normal    Flu A Result Not Detected      Flu B Result Not Detected      Coronavirus 2019, PCR Not Detected      Narrative:     This assay has received FDA Emergency Use Authorization (EUA) and  is only authorized for the duration of time that circumstances exist to justify the authorization of the emergency use of in vitro diagnostic tests for the detection of SARS-CoV-2 virus and/or diagnosis of COVID-19 infection under section 564(b)(1) of the Act, 21 U.S.C. 360bbb-3(b)(1). Testing for SARS-CoV-2 is only recommended for patients who meet current clinical and/or epidemiological  criteria as defined by federal, state, or local public health directives. This assay is an in vitro diagnostic nucleic acid amplification test for the qualitative detection of SARS-CoV-2, Influenza A, and Influenza B from nasopharyngeal specimens and has been validated for use at [facility]. Negative results do not preclude COVID-19 infections or Influenza A/B infections, and should not be used as the sole basis for diagnosis, treatment, or other management decisions. If Influenza A/B and RSV PCR results are negative, testing for Parainfluenza virus, Adenovirus and Metapneumovirus is routinely performed for [facility] and [facility] care inpatients, and is available on other patients by placing an add-on request.    HCG, URINE, QUALITATIVE - Normal    HCG, Urine NEGATIVE        Medical Decision Making  URI with cough congestion: COVID influenza strep  Urinary symptoms rule out UTI    Nasal swabs negative.  Urinalysis concerning for UTI.  Urine culture sent.  Macrobid    Prescribed Macrobid.  Use of Tylenol Motrin.  Outpatient follow-up PCP.  Advise return if condition worsens or symptoms progress.  Stable for discharge outpatient management.    Amount and/or Complexity of Data Reviewed  Labs: ordered. Decision-making details documented in ED Course.     Details: As above    Risk  OTC drugs.  Prescription drug management.        Procedure  Procedures     Gary Eaton PA-C  03/09/24 1128

## 2024-03-09 NOTE — ED TRIAGE NOTES
Patient presents to ED via car with c/o generalized body aches, increased urination, headache chills temp.     Patient alert and oriented x 4, skin hot, pink, diaphoretic, in NAD, resp. easy unlabored.

## 2024-03-10 LAB — BACTERIA UR CULT: NORMAL

## 2024-03-13 ENCOUNTER — HOSPITAL ENCOUNTER (EMERGENCY)
Facility: HOSPITAL | Age: 24
Discharge: HOME | End: 2024-03-13
Attending: EMERGENCY MEDICINE
Payer: COMMERCIAL

## 2024-03-13 VITALS
WEIGHT: 195 LBS | OXYGEN SATURATION: 99 % | TEMPERATURE: 98.2 F | HEIGHT: 67 IN | RESPIRATION RATE: 18 BRPM | BODY MASS INDEX: 30.61 KG/M2 | SYSTOLIC BLOOD PRESSURE: 121 MMHG | DIASTOLIC BLOOD PRESSURE: 81 MMHG | HEART RATE: 89 BPM

## 2024-03-13 DIAGNOSIS — N39.0 UTI (URINARY TRACT INFECTION), UNCOMPLICATED: Primary | ICD-10-CM

## 2024-03-13 LAB
APPEARANCE UR: ABNORMAL
BILIRUB UR STRIP.AUTO-MCNC: NEGATIVE MG/DL
C TRACH RRNA SPEC QL NAA+PROBE: NEGATIVE
CLUE CELLS SPEC QL WET PREP: NORMAL
COLOR UR: ABNORMAL
FLUAV RNA RESP QL NAA+PROBE: NOT DETECTED
FLUBV RNA RESP QL NAA+PROBE: NOT DETECTED
GLUCOSE UR STRIP.AUTO-MCNC: NORMAL MG/DL
HCV AB SER QL: NONREACTIVE
HIV 1+2 AB+HIV1 P24 AG SERPL QL IA: NONREACTIVE
KETONES UR STRIP.AUTO-MCNC: ABNORMAL MG/DL
LEUKOCYTE ESTERASE UR QL STRIP.AUTO: ABNORMAL
MUCOUS THREADS #/AREA URNS AUTO: ABNORMAL /LPF
N GONORRHOEA DNA SPEC QL PROBE+SIG AMP: NEGATIVE
NITRITE UR QL STRIP.AUTO: NEGATIVE
PH UR STRIP.AUTO: 6.5 [PH]
PREGNANCY TEST URINE, POC: NEGATIVE
PROT UR STRIP.AUTO-MCNC: ABNORMAL MG/DL
RBC # UR STRIP.AUTO: ABNORMAL /UL
RBC #/AREA URNS AUTO: >20 /HPF
SARS-COV-2 RNA RESP QL NAA+PROBE: NOT DETECTED
SP GR UR STRIP.AUTO: 1.04
SQUAMOUS #/AREA URNS AUTO: ABNORMAL /HPF
T VAGINALIS SPEC QL WET PREP: NORMAL
TREPONEMA PALLIDUM IGG+IGM AB [PRESENCE] IN SERUM OR PLASMA BY IMMUNOASSAY: NONREACTIVE
TRICHOMONAS REFLEX COMMENT: NORMAL
UROBILINOGEN UR STRIP.AUTO-MCNC: NORMAL MG/DL
WBC #/AREA URNS AUTO: >50 /HPF
WBC CLUMPS #/AREA URNS AUTO: ABNORMAL /HPF
WBC VAG QL WET PREP: NORMAL
YEAST VAG QL WET PREP: NORMAL

## 2024-03-13 PROCEDURE — 87389 HIV-1 AG W/HIV-1&-2 AB AG IA: CPT | Performed by: PHYSICIAN ASSISTANT

## 2024-03-13 PROCEDURE — 87800 DETECT AGNT MULT DNA DIREC: CPT | Performed by: PHYSICIAN ASSISTANT

## 2024-03-13 PROCEDURE — 86780 TREPONEMA PALLIDUM: CPT | Performed by: PHYSICIAN ASSISTANT

## 2024-03-13 PROCEDURE — 99284 EMERGENCY DEPT VISIT MOD MDM: CPT | Performed by: PHYSICIAN ASSISTANT

## 2024-03-13 PROCEDURE — 86803 HEPATITIS C AB TEST: CPT | Performed by: PHYSICIAN ASSISTANT

## 2024-03-13 PROCEDURE — 87210 SMEAR WET MOUNT SALINE/INK: CPT | Performed by: PHYSICIAN ASSISTANT

## 2024-03-13 PROCEDURE — 36415 COLL VENOUS BLD VENIPUNCTURE: CPT | Performed by: PHYSICIAN ASSISTANT

## 2024-03-13 PROCEDURE — 87636 SARSCOV2 & INF A&B AMP PRB: CPT | Performed by: PHYSICIAN ASSISTANT

## 2024-03-13 PROCEDURE — 99283 EMERGENCY DEPT VISIT LOW MDM: CPT

## 2024-03-13 PROCEDURE — 81001 URINALYSIS AUTO W/SCOPE: CPT | Performed by: EMERGENCY MEDICINE

## 2024-03-13 PROCEDURE — 87086 URINE CULTURE/COLONY COUNT: CPT | Performed by: PHYSICIAN ASSISTANT

## 2024-03-13 PROCEDURE — 87661 TRICHOMONAS VAGINALIS AMPLIF: CPT | Performed by: PHYSICIAN ASSISTANT

## 2024-03-13 PROCEDURE — 2500000005 HC RX 250 GENERAL PHARMACY W/O HCPCS: Mod: SE | Performed by: PHYSICIAN ASSISTANT

## 2024-03-13 PROCEDURE — 81025 URINE PREGNANCY TEST: CPT

## 2024-03-13 RX ORDER — ONDANSETRON 4 MG/1
4 TABLET, ORALLY DISINTEGRATING ORAL ONCE
Status: COMPLETED | OUTPATIENT
Start: 2024-03-13 | End: 2024-03-13

## 2024-03-13 RX ADMIN — ONDANSETRON 4 MG: 4 TABLET, ORALLY DISINTEGRATING ORAL at 12:01

## 2024-03-13 NOTE — ED PROVIDER NOTES
HPI   Chief Complaint   Patient presents with    Nausea       Patient is a 23-year-old female who presents today after being seen on 3/9/2024.  She states that when she was seen initially on Saturday, the day before she started experiencing flulike symptoms, states she had nasal congestion, cough, sweats, body aches, also had a headache with pain with eye movements, she had a negative flu and COVID swab however urinalysis at that time was positive for leukocyte esterase and 1+ bacteria and she was subsequently treated with Macrobid for UTI.  Patient states that she has been on it for 4-1/2 days, she is finally starting to feel better as of yesterday.  Patient denies ever having any urinary frequency burning or urgency and per review of her urine culture, this ended up being negative.  Patient states that the reason she is here today is because she started having vaginal spotting, last menstrual period was 2 weeks ago and she is never had irregular bleeding before.  Patient has had a bilateral salpingectomy, states that there is no chance of pregnancy, she is sexually active and is not concerned about STDs but is agreeable to be tested.  She does endorse that yesterday she had some right-sided abdominal pain but that has since subsided.  She also endorses nausea with 1 episode of nonbloody vomiting.  Denies any urinary frequency burning urgency.  Denies any fevers or chills.                          No data recorded                   Patient History   Past Medical History:   Diagnosis Date    Encounter for screening for infections with a predominantly sexual mode of transmission 07/17/2018    Screening for STDs (sexually transmitted diseases)    Encounter for supervision of other normal pregnancy, first trimester 04/10/2020    Prenatal care, subsequent pregnancy in first trimester    Encounter for surveillance of injectable contraceptive 05/06/2019    Encounter for Depo-Provera contraception    Nausea 12/11/2019     Nausea in adult    Other conditions influencing health status 04/09/2014    No significant past medical history    Other problems related to lifestyle 11/07/2018    Other problems related to lifestyle    Personal history of other diseases of the respiratory system 12/11/2019    History of sore throat    Personal history of other infectious and parasitic diseases 12/11/2019    History of viral infection    Personal history of other infectious and parasitic diseases 04/13/2017    History of Nestor-Barr virus infection    Personal history of other infectious and parasitic diseases 01/25/2018    History of trichomonal vaginitis    Personal history of other specified conditions 12/11/2019    History of abdominal pain    Personal history of other specified conditions 03/20/2017    History of abdominal pain    Persons encountering health services in other specified circumstances 08/20/2019    Encounter to establish care    Unequal limb length (acquired), unspecified site 04/24/2014    Leg length discrepancy     Past Surgical History:   Procedure Laterality Date    UMBILICAL HERNIA REPAIR  09/14/2015    Umbilical Hernia Repair     Family History   Problem Relation Name Age of Onset    Hypertension Sister      Thyroid disease Sister      Thyroid disease Maternal Grandmother      Chromosomal disorder Other Mat halfsister      Social History     Tobacco Use    Smoking status: Never    Smokeless tobacco: Never   Substance Use Topics    Alcohol use: Not on file    Drug use: Not on file       Physical Exam   ED Triage Vitals [03/13/24 1100]   Temperature Heart Rate Respirations BP   36.8 °C (98.2 °F) 89 18 121/81      Pulse Ox Temp src Heart Rate Source Patient Position   99 % -- -- --      BP Location FiO2 (%)     -- --       Physical Exam  Vitals and nursing note reviewed.   Constitutional:       General: She is not in acute distress.     Appearance: Normal appearance. She is not toxic-appearing.   HENT:      Head:  Normocephalic and atraumatic.      Nose: Nose normal.   Eyes:      Extraocular Movements: Extraocular movements intact.   Cardiovascular:      Rate and Rhythm: Normal rate and regular rhythm.   Pulmonary:      Effort: Pulmonary effort is normal.      Breath sounds: Normal breath sounds.   Abdominal:      Palpations: Abdomen is soft.      Tenderness: There is no abdominal tenderness. Negative signs include Chambers's sign, Rovsing's sign, McBurney's sign, psoas sign and obturator sign.   Genitourinary:     Comments: Chaperoned by Rossy female RN. Slight red bleeding noted in vaginal vault, no clots, no CMT, no adnexal fullness masses or tenderness.  Swabs obtained.  Musculoskeletal:         General: Normal range of motion.      Cervical back: Normal range of motion and neck supple.   Skin:     General: Skin is warm and dry.   Neurological:      General: No focal deficit present.      Mental Status: She is alert.   Psychiatric:         Mood and Affect: Mood normal.         Thought Content: Thought content normal.       No orders to display     Labs Reviewed   URINALYSIS WITH REFLEX MICROSCOPIC - Abnormal       Result Value    Color, Urine Orange (*)     Appearance, Urine Ex.Turbid (*)     Specific Gravity, Urine 1.037 (*)     pH, Urine 6.5      Protein, Urine 300 (3+) (*)     Glucose, Urine Normal      Blood, Urine 1.0 (3+) (*)     Ketones, Urine TRACE (*)     Bilirubin, Urine NEGATIVE      Urobilinogen, Urine Normal      Nitrite, Urine NEGATIVE      Leukocyte Esterase, Urine 250 Jairon/µL (*)    MICROSCOPIC ONLY, URINE - Abnormal    WBC, Urine >50 (*)     WBC Clumps, Urine MANY      RBC, Urine >20 (*)     Squamous Epithelial Cells, Urine 10-25 (FEW)      Mucus, Urine 4+     HIV 1/2 ANTIGEN/ANTIBODY SCREEN [facility] REFLEX TO CONFIRMATION - Normal    HIV 1/2 Antigen/Antibody Screen with Reflex to Confirmation Nonreactive      Narrative:     HIV Ag/Ab screen is performed using the Siemens [employer] HIV Ag/Ab Combo assay which  detects the presence of HIV p24 antigen as well as antibodies to HIV-1 (Group M and O) and HIV-2.  No laboratory evidence of HIV infection. If acute HIV infection is suspected, consider testing for HIV RNA by PCR (viral load).   SYPHILIS SCREENING WITH REFLEX - Normal    Syphilis Total Ab Nonreactive     HEPATITIS C ANTIBODY - Normal    Hepatitis C AB Nonreactive     SARS-COV-2 AND INFLUENZA A/B PCR - Normal    Flu A Result Not Detected      Flu B Result Not Detected      Coronavirus 2019, PCR Not Detected      Narrative:     This assay has received FDA Emergency Use Authorization (EUA) and  is only authorized for the duration of time that circumstances exist to justify the authorization of the emergency use of in vitro diagnostic tests for the detection of SARS-CoV-2 virus and/or diagnosis of COVID-19 infection under section 564(b)(1) of the Act, 21 U.S.C. 360bbb-3(b)(1). Testing for SARS-CoV-2 is only recommended for patients who meet current clinical and/or epidemiological criteria as defined by federal, state, or local public health directives. This assay is an in vitro diagnostic nucleic acid amplification test for the qualitative detection of SARS-CoV-2, Influenza A, and Influenza B from nasopharyngeal specimens and has been validated for use at [facility]. Negative results do not preclude COVID-19 infections or Influenza A/B infections, and should not be used as the sole basis for diagnosis, treatment, or other management decisions. If Influenza A/B and RSV PCR results are negative, testing for Parainfluenza virus, Adenovirus and Metapneumovirus is routinely performed for [facility] pediatric oncology and intensive care inpatients, and is available on other patients by placing an add-on request.    POCT PREGNANCY, URINE - Normal    Preg Test, Ur Negative     URINE CULTURE   TRICHOMONAS WET PREP REFLEX TO PCR    Trichomonas None Seen      Clue Cells None Seen      Yeast None Seen      WBC NONE  SEEN      Trichomonas reflex comment        Value: Trichomonas was not seen by wet prep. Reflex Trichomonas vaginalis by Amplified Detection.   URINALYSIS WITH REFLEX CULTURE AND MICROSCOPIC    Narrative:     The following orders were created for panel order Urinalysis with Reflex Culture and Microscopic.  Procedure                               Abnormality         Status                     ---------                               -----------         ------                     Urinalysis with Reflex C...[[id number]]                                                 Extra Urine Gray Tube[[id number]]                                                         Please view results for these tests on the individual orders.   URINALYSIS WITH REFLEX CULTURE AND MICROSCOPIC   EXTRA URINE GRAY TUBE   C. TRACHOMATIS + N. GONORRHOEAE, AMPLIFIED   TRICH VAGINALIS, AMPLIFIED         ED Course & MDM   Diagnoses as of 03/13/24 1702   UTI (urinary tract infection), uncomplicated       Medical Decision Making      MDM: Patient is a 23-year-old female who presents today for evaluation of vaginal bleeding 2 weeks after her last menstrual period after being seen a few days ago for flulike symptoms and subsequently tested positive for UTI that ended up growing a negative culture. Patient's hepatitis syphilis and HIV testing is negative, flu and COVID is negative, urinalysis did show 250 leukocyte esterase with no nitrites, greater than 50 white blood cells and 10-25 squamous epithelial cells without bacteria.  Given that patient is still on Macrobid, she is encouraged to continue although I suspect this may be contaminated sample given 10-25 squamous epithelial cells.  Gonorrhea chlamydia are currently still pending.  Patient was seen and evaluated in conjunction with Dr. Garner, ED attending, do not feel that there is any further indication for lab work or imaging.  Patient had no abdominal tenderness on examination or on bimanual exam.  Feel  that she can safely be discharged home at this time.        Procedure  Procedures     Paulette Chan PA-C  03/13/24 3561

## 2024-03-13 NOTE — ED TRIAGE NOTES
Tx for UTI at [facility]. Having n/v/cramps with Macrobid. Noticed blood in her underwear today and is concerned. LMP 2 weeks ago. Reports both fallopian tubes have been removed

## 2024-03-14 ENCOUNTER — DOCUMENTATION (OUTPATIENT)
Dept: EMERGENCY MEDICINE | Facility: HOSPITAL | Age: 24
End: 2024-03-14
Payer: COMMERCIAL

## 2024-03-14 LAB
BACTERIA UR CULT: NORMAL
T VAGINALIS RRNA SPEC QL NAA+PROBE: NEGATIVE

## 2024-03-14 NOTE — RESEARCH NOTES
2:49 PM    Test:   Discussed HIV/HCV testing with the patient in the ED.   Patient received [HIV and HCV] test today    Test Result:  HIV [Negative]  HCV [Negative]    Result notification:  Patient was notified of their test results on [03-] via phone after verifying 3 patient identifiers]    Follow-up plan:   Patient was referred to [No Referral] on [Date]  Patient has appointment at [No Appointment] on [Date]  Barriers to attending appointment: [None]  Missed appointments: [None]    Signed  Chukcie Carvajal Jr.  HIV/HCV FOCUS Program  Department of Emergency Medicine - Research  Please contact me via email or Epic Chat with questions

## 2024-05-29 ENCOUNTER — LAB (OUTPATIENT)
Dept: LAB | Facility: LAB | Age: 24
End: 2024-05-29
Payer: COMMERCIAL

## 2024-05-29 ENCOUNTER — TELEPHONE (OUTPATIENT)
Dept: HEMATOLOGY/ONCOLOGY | Facility: HOSPITAL | Age: 24
End: 2024-05-29

## 2024-05-29 DIAGNOSIS — D64.9 ANEMIA, UNSPECIFIED TYPE: ICD-10-CM

## 2024-05-29 DIAGNOSIS — D64.9 ANEMIA, UNSPECIFIED TYPE: Primary | ICD-10-CM

## 2024-05-29 LAB
ALBUMIN SERPL BCP-MCNC: 4 G/DL (ref 3.4–5)
ALP SERPL-CCNC: 61 U/L (ref 33–110)
ALT SERPL W P-5'-P-CCNC: 7 U/L (ref 7–45)
ANION GAP SERPL CALC-SCNC: 12 MMOL/L (ref 10–20)
AST SERPL W P-5'-P-CCNC: 11 U/L (ref 9–39)
BASOPHILS # BLD AUTO: 0.02 X10*3/UL (ref 0–0.1)
BASOPHILS NFR BLD AUTO: 0.4 %
BILIRUB SERPL-MCNC: 0.4 MG/DL (ref 0–1.2)
BUN SERPL-MCNC: 7 MG/DL (ref 6–23)
CALCIUM SERPL-MCNC: 9.3 MG/DL (ref 8.6–10.6)
CHLORIDE SERPL-SCNC: 105 MMOL/L (ref 98–107)
CO2 SERPL-SCNC: 27 MMOL/L (ref 21–32)
CREAT SERPL-MCNC: 0.8 MG/DL (ref 0.5–1.05)
EGFRCR SERPLBLD CKD-EPI 2021: >90 ML/MIN/1.73M*2
EOSINOPHIL # BLD AUTO: 0.08 X10*3/UL (ref 0–0.7)
EOSINOPHIL NFR BLD AUTO: 1.6 %
ERYTHROCYTE [DISTWIDTH] IN BLOOD BY AUTOMATED COUNT: 16 % (ref 11.5–14.5)
FERRITIN SERPL-MCNC: 35 NG/ML (ref 8–150)
FOLATE SERPL-MCNC: 9.6 NG/ML
GLUCOSE SERPL-MCNC: 81 MG/DL (ref 74–99)
HCT VFR BLD AUTO: 39.6 % (ref 36–46)
HGB BLD-MCNC: 11 G/DL (ref 12–16)
IMM GRANULOCYTES # BLD AUTO: 0.01 X10*3/UL (ref 0–0.7)
IMM GRANULOCYTES NFR BLD AUTO: 0.2 % (ref 0–0.9)
IRON SATN MFR SERPL: 14 % (ref 25–45)
IRON SERPL-MCNC: 43 UG/DL (ref 35–150)
LYMPHOCYTES # BLD AUTO: 2.14 X10*3/UL (ref 1.2–4.8)
LYMPHOCYTES NFR BLD AUTO: 43.3 %
MCH RBC QN AUTO: 26.4 PG (ref 26–34)
MCHC RBC AUTO-ENTMCNC: 27.8 G/DL (ref 32–36)
MCV RBC AUTO: 95 FL (ref 80–100)
MONOCYTES # BLD AUTO: 0.3 X10*3/UL (ref 0.1–1)
MONOCYTES NFR BLD AUTO: 6.1 %
NEUTROPHILS # BLD AUTO: 2.39 X10*3/UL (ref 1.2–7.7)
NEUTROPHILS NFR BLD AUTO: 48.4 %
NRBC BLD-RTO: 0 /100 WBCS (ref 0–0)
PLATELET # BLD AUTO: 193 X10*3/UL (ref 150–450)
POTASSIUM SERPL-SCNC: 3.8 MMOL/L (ref 3.5–5.3)
PROT SERPL-MCNC: 7.3 G/DL (ref 6.4–8.2)
RBC # BLD AUTO: 4.16 X10*6/UL (ref 4–5.2)
SODIUM SERPL-SCNC: 140 MMOL/L (ref 136–145)
TIBC SERPL-MCNC: 302 UG/DL (ref 240–445)
UIBC SERPL-MCNC: 259 UG/DL (ref 110–370)
VIT B12 SERPL-MCNC: 557 PG/ML (ref 211–911)
WBC # BLD AUTO: 4.9 X10*3/UL (ref 4.4–11.3)

## 2024-05-29 PROCEDURE — 80053 COMPREHEN METABOLIC PANEL: CPT

## 2024-05-29 PROCEDURE — 85025 COMPLETE CBC W/AUTO DIFF WBC: CPT

## 2024-05-29 PROCEDURE — 82607 VITAMIN B-12: CPT

## 2024-05-29 PROCEDURE — 82728 ASSAY OF FERRITIN: CPT

## 2024-05-29 PROCEDURE — 83550 IRON BINDING TEST: CPT

## 2024-05-29 PROCEDURE — 83540 ASSAY OF IRON: CPT

## 2024-05-29 PROCEDURE — 36415 COLL VENOUS BLD VENIPUNCTURE: CPT

## 2024-05-29 PROCEDURE — 82746 ASSAY OF FOLIC ACID SERUM: CPT

## 2024-05-30 ENCOUNTER — OFFICE VISIT (OUTPATIENT)
Dept: HEMATOLOGY/ONCOLOGY | Facility: HOSPITAL | Age: 24
End: 2024-05-30
Payer: COMMERCIAL

## 2024-05-30 VITALS
OXYGEN SATURATION: 98 % | BODY MASS INDEX: 30.97 KG/M2 | RESPIRATION RATE: 19 BRPM | HEART RATE: 70 BPM | DIASTOLIC BLOOD PRESSURE: 67 MMHG | WEIGHT: 197.75 LBS | TEMPERATURE: 97.3 F | SYSTOLIC BLOOD PRESSURE: 121 MMHG

## 2024-05-30 DIAGNOSIS — N94.6 MENSTRUAL PAIN: ICD-10-CM

## 2024-05-30 DIAGNOSIS — E61.1 IRON DEFICIENCY: ICD-10-CM

## 2024-05-30 DIAGNOSIS — O99.013 ANEMIA AFFECTING PREGNANCY IN THIRD TRIMESTER (HHS-HCC): ICD-10-CM

## 2024-05-30 DIAGNOSIS — K90.9 INTESTINAL MALABSORPTION, UNSPECIFIED TYPE (HHS-HCC): ICD-10-CM

## 2024-05-30 DIAGNOSIS — D64.9 ANEMIA, UNSPECIFIED TYPE: Primary | ICD-10-CM

## 2024-05-30 PROCEDURE — 99214 OFFICE O/P EST MOD 30 MIN: CPT | Performed by: PHYSICIAN ASSISTANT

## 2024-05-30 PROCEDURE — 3008F BODY MASS INDEX DOCD: CPT | Performed by: PHYSICIAN ASSISTANT

## 2024-05-30 RX ORDER — FAMOTIDINE 10 MG/ML
20 INJECTION INTRAVENOUS ONCE AS NEEDED
Status: CANCELLED | OUTPATIENT
Start: 2024-06-11

## 2024-05-30 RX ORDER — HEPARIN SODIUM,PORCINE/PF 10 UNIT/ML
50 SYRINGE (ML) INTRAVENOUS AS NEEDED
Status: CANCELLED | OUTPATIENT
Start: 2024-06-11

## 2024-05-30 RX ORDER — DIPHENHYDRAMINE HYDROCHLORIDE 50 MG/ML
50 INJECTION INTRAMUSCULAR; INTRAVENOUS AS NEEDED
Status: CANCELLED | OUTPATIENT
Start: 2024-06-11

## 2024-05-30 RX ORDER — ALBUTEROL SULFATE 0.83 MG/ML
3 SOLUTION RESPIRATORY (INHALATION) AS NEEDED
Status: CANCELLED | OUTPATIENT
Start: 2024-06-11

## 2024-05-30 RX ORDER — EPINEPHRINE 0.3 MG/.3ML
0.3 INJECTION SUBCUTANEOUS EVERY 5 MIN PRN
Status: CANCELLED | OUTPATIENT
Start: 2024-06-11

## 2024-05-30 RX ORDER — HEPARIN 100 UNIT/ML
500 SYRINGE INTRAVENOUS AS NEEDED
Status: CANCELLED | OUTPATIENT
Start: 2024-06-11

## 2024-05-30 ASSESSMENT — PAIN SCALES - GENERAL: PAINLEVEL: 0-NO PAIN

## 2024-05-30 NOTE — PROGRESS NOTES
Patient ID: Willie Ohara is a 24 y.o. female.  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    [place] Hx:  6/15/23 - Last Visit w/ H. ELIS Urias  Location: [facility]  Initial consult: 6/15/23   Reason: iron def anemia     Patient is a 22 yo female with a PMH of obesity, nasrin and was referred to benign hematology for consultation of iron deficiency anemia. Referred by Dr. Melo, OB/GYN.     Today, patient presents for initial consultation. +fatigue, pica - chew ice, low energy.  Ferrous sulfate 3times a day for a few months w/o improvement in counts. Stool softener. Nausea, upset stomach with oral iron and constipation. Was vomiting unless took with food.  4 yrs ago - had IV iron while pregnant as could not absorb oral iron. 2yrs ago, with 2nd pregnancy, no issues and did not require oral or iv iron.  2023 and no heavy  bleeding with it  Has periods regularly and are not heavy   GI issues since gallbladder removed 2021. Digestive enzymes supplement - takes before eating and is helping.   To start Wegovy for weight loss.      Denies abnormal weight loss, night sweats, fever, sob, cp, n/v/d, n/t, edema. Denies any abnormal bleeding or bruising. No recurrent infections or lymphadenopathy. No joint/body pain. No known blood disorders in family. Has had surgery in past w/o issue.  Never had blood/blood products.     PMHx:  Active Ambulatory Problems     Diagnosis Date Noted    Anemia affecting pregnancy in third trimester ([facility]) 03/13/2023    Anemia in pregnancy ([facility]) 03/13/2023    Back pain in pregnancy ([facility]) 03/13/2023    Breast mass in female 03/13/2023    Breast mass, right 03/13/2023    Breast pain, left 03/13/2023    Cholelithiasis 03/13/2023    Chronic right-sided low back pain without sciatica 03/13/2023    Constipation during pregnancy in first trimester ([facility]) 03/13/2023    Constipation during pregnancy in third trimester ([facility]) 03/13/2023    Family historic risk of congenital abnormality ([facility]) 03/13/2023    Nausea and vomiting during pregnancy ([facility]) 03/13/2023    Neck pain 03/13/2023    Cervicalgia 03/13/2023    Reaction to tuberculin skin test without active tuberculosis 03/13/2023    Right shoulder pain 03/13/2023    Weight gain 03/13/2023    Amenorrhea 03/13/2023    Nausea in adult 03/13/2023    Sore throat 03/13/2023     Resolved Ambulatory Problems     Diagnosis Date Noted    No Resolved Ambulatory Problems     Past Medical History:   Diagnosis Date    Encounter for screening for infections with a predominantly sexual mode of transmission 07/17/2018    Encounter for supervision of other normal pregnancy, first trimester ([facility]) 04/10/2020    Encounter for surveillance of injectable contraceptive 05/06/2019    Nausea 12/11/2019    Other conditions influencing health status 04/09/2014    Other  problems related to lifestyle 11/07/2018    Personal history of other diseases of the respiratory system 12/11/2019    Personal history of other infectious and parasitic diseases 12/11/2019    Personal history of other infectious and parasitic diseases 04/13/2017    Personal history of other infectious and parasitic diseases 01/25/2018    Personal history of other specified conditions 12/11/2019    Personal history of other specified conditions 03/20/2017    Persons encountering health services in other specified circumstances 08/20/2019    Unequal limb length (acquired), unspecified site 04/24/2014     PSHx:  Past Surgical History:   Procedure Laterality Date    UMBILICAL HERNIA REPAIR  09/14/2015    Umbilical Hernia Repair      Cholecystectomy - 2021    FHx:  Family History   Problem Relation Name Age of Onset    Hypertension Sister      Thyroid disease Sister      Thyroid disease Maternal Grandmother      Chromosomal disorder Other Mat halfsister       FH: HTN, Thyroid disease, Chromosome abnormalities  Children - 2 - Denies significant medical hx    Social Hx:  Willie Ohara    reports that she has never smoked. She has never used smokeless tobacco.  She  has no history on file for alcohol use.  She  has no history on file for drug use.  Social History     Socioeconomic History    Marital status: Single     Spouse name: Not on file    Number of children: Not on file    Years of education: Not on file    Highest education level: Not on file   Occupational History    Not on file   Tobacco Use    Smoking status: Never    Smokeless tobacco: Never   Substance and Sexual Activity    Alcohol use: Not on file    Drug use: Not on file    Sexual activity: Not on file   Other Topics Concern    Not on file   Social History Narrative    Not on file     Social Determinants of Health     Financial Resource Strain: Not on file   Food Insecurity: Not on file   Transportation Needs: Not on file   Physical Activity: Not on file    Stress: Not on file   Social Connections: Not on file   Intimate Partner Violence: Not on file   Housing Stability: Not on file      Living Situation: Lives at home w/ family  Alcohol Use: Occasionally  Smoking: Denies  Recreational Drug Use: Denies  Special Diets: Regular    Medications and allergies reviewed in EMR.    ROS:  Review of Systems - Oncology   10 point review of systems negative except as state in HPI.    Vitals & Statistics:  Objective   BSA: There is no height or weight on file to calculate BSA.  There were no vitals taken for this visit.    Physical Exam:  Physical Exam  Vitals and nursing note reviewed.   Constitutional:       Appearance: Normal appearance. She is normal weight.   HENT:      Head: Normocephalic and atraumatic.      Right Ear: External ear normal.      Left Ear: External ear normal.      Nose: Nose normal.      Mouth/Throat:      Mouth: Mucous membranes are moist.      Pharynx: Oropharynx is clear.   Eyes:      Extraocular Movements: Extraocular movements intact.      Conjunctiva/sclera: Conjunctivae normal.      Pupils: Pupils are equal, round, and reactive to light.   Cardiovascular:      Rate and Rhythm: Normal rate and regular rhythm.      Pulses: Normal pulses.      Heart sounds: Normal heart sounds.   Pulmonary:      Effort: Pulmonary effort is normal.      Breath sounds: Normal breath sounds.   Abdominal:      General: Abdomen is flat. Bowel sounds are normal.      Palpations: Abdomen is soft.      Comments: No masses or organomegaly palpable during exam   Musculoskeletal:         General: Normal range of motion.      Cervical back: Normal range of motion.   Lymphadenopathy:      Comments: No lymphadenopathy palpable   Skin:     General: Skin is warm and dry.      Capillary Refill: Capillary refill takes less than 2 seconds.   Neurological:      Mental Status: She is alert and oriented to person, place, and time. Mental status is at baseline.   Psychiatric:         Mood and  "Affect: Mood normal.         Behavior: Behavior normal.         Thought Content: Thought content normal.         Judgment: Judgment normal.       Results:  Lab Results   Component Value Date    WBC 4.9 05/29/2024    NEUTROABS 2.39 05/29/2024    IGABSOL 0.01 05/29/2024    LYMPHSABS 2.14 05/29/2024    MONOSABS 0.30 05/29/2024    EOSABS 0.08 05/29/2024    BASOSABS 0.02 05/29/2024    RBC 4.16 05/29/2024    MCV 95 05/29/2024    MCHC 27.8 (L) 05/29/2024    HGB 11.0 (L) 05/29/2024    HCT 39.6 05/29/2024     05/29/2024     Lab Results   Component Value Date    RETICCTPCT CANCELED 06/15/2023    RETICCTPCT 1.7 06/15/2023      Lab Results   Component Value Date    CREATININE 0.80 05/29/2024    BUN 7 05/29/2024    EGFR >90 05/29/2024     05/29/2024    K 3.8 05/29/2024     05/29/2024    CO2 27 05/29/2024      Lab Results   Component Value Date    ALT 7 05/29/2024    AST 11 05/29/2024    ALKPHOS 61 05/29/2024    BILITOT 0.4 05/29/2024      Lab Results   Component Value Date    TSH 1.68 08/24/2022     Lab Results   Component Value Date    TSH 1.68 08/24/2022     Lab Results   Component Value Date    IRON 43 05/29/2024    TIBC 302 05/29/2024    FERRITIN 35 05/29/2024      Lab Results   Component Value Date    NHPZBKAQ99 557 05/29/2024      Lab Results   Component Value Date    FOLATE 9.6 05/29/2024     No results found for: \"ELLIOT\", \"RF\", \"SEDRATE\"   No results found for: \"CRP\"   No results found for: \"BRIANA\"  Lab Results   Component Value Date     06/15/2023     Lab Results   Component Value Date    HAPTOGLOBIN 246 (H) 06/15/2023     No results found for: \"SPEP\"  No results found for: \"IGG\", \"IGM\", \"IGA\"  Lab Results   Component Value Date    HEPBSAG NONREACTIVE 08/24/2022    HEPCAB Nonreactive 03/13/2024     Lab Results   Component Value Date    HIV1X2 Nonreactive 03/13/2024       Assessment:  Willie Ohara is a 24 y.o. female following up today for MILAGROS (2/2 malabsorption)    I reviewed patient's chart " including but not limited to labs, imaging, surgical/procedure notes, pathology, hospital notes, doctor's notes.    5/29/24 results:  WBC count & differential WNL  NC/HC anemia w/ Hb low at 11.0 - RBC count WNL, Hct WNL, RDW elevated  Platelets WNL  Iron studies: Ferritin decreased and low for this practice at 35, Iron and TIBC WNL - %Sat low at 14%     Plan:  MILAGROS (2/2 Malabsorption)  Request prior authorization for IV iron sucrose (Venofer) 300mg weekly x 3  Dx Codes: D64.9 - Anemia, E61.1 - Iron Deficiency, K90.9 - Malabsorption of Iron  Anticipated start date: 6/11, 6/18, 6/25  Will switch to IV ferumoxytol (Feraheme) 510mg weekly x 2 if preferred by insurance  Referral to Dr. Fatimah Alcantar for persistently painful menses  RTC in 2 months after final IV Venofer infusion via virtual/telehealth visit - F/U sooner if needed/urgent  CBC-D, CMP, Iron, B12, Folate up to 1 week prior    I had an extensive discussion with the patient regarding the diagnosis and discussed the plan of therapy, including general considerations regarding side effects and outcomes. Pt understood and gave appropriate teach back about the plan of care. All questions were answered to the patient's satisfaction. The patient is instructed to contact us at any time if questions or problems arise. Thank you for the opportunity to participate in the care of this very pleasant patient.    Total time = 30 minutes. 50% or more of this time was spent in counseling and/or coordination of care including reviewing medical history/radiology/labs, examining patient, formulating outlined plan with team, and discussing plan with patient/family.    Gabriel Sotelo PA-C

## 2024-06-11 ENCOUNTER — INFUSION (OUTPATIENT)
Dept: HEMATOLOGY/ONCOLOGY | Facility: HOSPITAL | Age: 24
End: 2024-06-11
Payer: COMMERCIAL

## 2024-06-11 VITALS
OXYGEN SATURATION: 100 % | DIASTOLIC BLOOD PRESSURE: 88 MMHG | SYSTOLIC BLOOD PRESSURE: 124 MMHG | BODY MASS INDEX: 30.67 KG/M2 | WEIGHT: 195.8 LBS | RESPIRATION RATE: 16 BRPM | HEART RATE: 70 BPM | TEMPERATURE: 98.6 F

## 2024-06-11 DIAGNOSIS — K90.9 INTESTINAL MALABSORPTION, UNSPECIFIED TYPE (HHS-HCC): ICD-10-CM

## 2024-06-11 DIAGNOSIS — E61.1 IRON DEFICIENCY: ICD-10-CM

## 2024-06-11 DIAGNOSIS — D64.9 ANEMIA, UNSPECIFIED TYPE: ICD-10-CM

## 2024-06-11 DIAGNOSIS — O99.013 ANEMIA AFFECTING PREGNANCY IN THIRD TRIMESTER (HHS-HCC): ICD-10-CM

## 2024-06-11 PROCEDURE — 2500000004 HC RX 250 GENERAL PHARMACY W/ HCPCS (ALT 636 FOR OP/ED): Performed by: PHYSICIAN ASSISTANT

## 2024-06-11 PROCEDURE — 96365 THER/PROPH/DIAG IV INF INIT: CPT | Mod: INF

## 2024-06-11 RX ORDER — DIPHENHYDRAMINE HYDROCHLORIDE 50 MG/ML
50 INJECTION INTRAMUSCULAR; INTRAVENOUS AS NEEDED
OUTPATIENT
Start: 2024-06-18

## 2024-06-11 RX ORDER — FAMOTIDINE 10 MG/ML
20 INJECTION INTRAVENOUS ONCE AS NEEDED
OUTPATIENT
Start: 2024-06-18

## 2024-06-11 RX ORDER — HEPARIN SODIUM,PORCINE/PF 10 UNIT/ML
50 SYRINGE (ML) INTRAVENOUS AS NEEDED
Status: DISCONTINUED | OUTPATIENT
Start: 2024-06-11 | End: 2024-06-11 | Stop reason: HOSPADM

## 2024-06-11 RX ORDER — HEPARIN 100 UNIT/ML
500 SYRINGE INTRAVENOUS AS NEEDED
Status: DISCONTINUED | OUTPATIENT
Start: 2024-06-11 | End: 2024-06-11 | Stop reason: HOSPADM

## 2024-06-11 RX ORDER — HEPARIN SODIUM,PORCINE/PF 10 UNIT/ML
50 SYRINGE (ML) INTRAVENOUS AS NEEDED
OUTPATIENT
Start: 2024-06-11

## 2024-06-11 RX ORDER — EPINEPHRINE 0.3 MG/.3ML
0.3 INJECTION SUBCUTANEOUS EVERY 5 MIN PRN
OUTPATIENT
Start: 2024-06-18

## 2024-06-11 RX ORDER — HEPARIN 100 UNIT/ML
500 SYRINGE INTRAVENOUS AS NEEDED
OUTPATIENT
Start: 2024-06-11

## 2024-06-11 RX ORDER — ALBUTEROL SULFATE 0.83 MG/ML
3 SOLUTION RESPIRATORY (INHALATION) AS NEEDED
OUTPATIENT
Start: 2024-06-18

## 2024-06-11 RX ADMIN — IRON SUCROSE 300 MG: 20 INJECTION, SOLUTION INTRAVENOUS at 15:38

## 2024-06-11 ASSESSMENT — PAIN SCALES - GENERAL: PAINLEVEL: 0-NO PAIN

## 2024-06-19 ENCOUNTER — INFUSION (OUTPATIENT)
Dept: HEMATOLOGY/ONCOLOGY | Facility: HOSPITAL | Age: 24
End: 2024-06-19
Payer: COMMERCIAL

## 2024-06-19 VITALS
WEIGHT: 198.19 LBS | DIASTOLIC BLOOD PRESSURE: 69 MMHG | HEART RATE: 75 BPM | OXYGEN SATURATION: 100 % | RESPIRATION RATE: 18 BRPM | BODY MASS INDEX: 31.04 KG/M2 | TEMPERATURE: 98.2 F | SYSTOLIC BLOOD PRESSURE: 129 MMHG

## 2024-06-19 DIAGNOSIS — O99.013 ANEMIA AFFECTING PREGNANCY IN THIRD TRIMESTER (HHS-HCC): ICD-10-CM

## 2024-06-19 DIAGNOSIS — E61.1 IRON DEFICIENCY: ICD-10-CM

## 2024-06-19 DIAGNOSIS — K90.9 INTESTINAL MALABSORPTION, UNSPECIFIED TYPE (HHS-HCC): ICD-10-CM

## 2024-06-19 PROCEDURE — 96365 THER/PROPH/DIAG IV INF INIT: CPT | Mod: INF

## 2024-06-19 PROCEDURE — 2500000004 HC RX 250 GENERAL PHARMACY W/ HCPCS (ALT 636 FOR OP/ED): Performed by: PHYSICIAN ASSISTANT

## 2024-06-19 PROCEDURE — 96366 THER/PROPH/DIAG IV INF ADDON: CPT | Mod: INF

## 2024-06-19 RX ORDER — DIPHENHYDRAMINE HYDROCHLORIDE 50 MG/ML
50 INJECTION INTRAMUSCULAR; INTRAVENOUS AS NEEDED
Status: DISCONTINUED | OUTPATIENT
Start: 2024-06-19 | End: 2024-06-19 | Stop reason: HOSPADM

## 2024-06-19 RX ORDER — ALBUTEROL SULFATE 0.83 MG/ML
3 SOLUTION RESPIRATORY (INHALATION) AS NEEDED
Status: DISCONTINUED | OUTPATIENT
Start: 2024-06-19 | End: 2024-06-19 | Stop reason: HOSPADM

## 2024-06-19 RX ORDER — EPINEPHRINE 0.3 MG/.3ML
0.3 INJECTION SUBCUTANEOUS EVERY 5 MIN PRN
OUTPATIENT
Start: 2024-06-25

## 2024-06-19 RX ORDER — FAMOTIDINE 10 MG/ML
20 INJECTION INTRAVENOUS ONCE AS NEEDED
OUTPATIENT
Start: 2024-06-25

## 2024-06-19 RX ORDER — DIPHENHYDRAMINE HYDROCHLORIDE 50 MG/ML
50 INJECTION INTRAMUSCULAR; INTRAVENOUS AS NEEDED
OUTPATIENT
Start: 2024-06-25

## 2024-06-19 RX ORDER — FAMOTIDINE 10 MG/ML
20 INJECTION INTRAVENOUS ONCE AS NEEDED
Status: DISCONTINUED | OUTPATIENT
Start: 2024-06-19 | End: 2024-06-19 | Stop reason: HOSPADM

## 2024-06-19 RX ORDER — EPINEPHRINE 0.3 MG/.3ML
0.3 INJECTION SUBCUTANEOUS EVERY 5 MIN PRN
Status: DISCONTINUED | OUTPATIENT
Start: 2024-06-19 | End: 2024-06-19 | Stop reason: HOSPADM

## 2024-06-19 RX ORDER — ALBUTEROL SULFATE 0.83 MG/ML
3 SOLUTION RESPIRATORY (INHALATION) AS NEEDED
OUTPATIENT
Start: 2024-06-25

## 2024-06-19 ASSESSMENT — PAIN SCALES - GENERAL: PAINLEVEL: 9

## 2024-06-25 ENCOUNTER — INFUSION (OUTPATIENT)
Dept: HEMATOLOGY/ONCOLOGY | Facility: HOSPITAL | Age: 24
End: 2024-06-25
Payer: COMMERCIAL

## 2024-06-25 VITALS
TEMPERATURE: 97 F | RESPIRATION RATE: 18 BRPM | SYSTOLIC BLOOD PRESSURE: 127 MMHG | OXYGEN SATURATION: 99 % | BODY MASS INDEX: 31.04 KG/M2 | WEIGHT: 198.2 LBS | DIASTOLIC BLOOD PRESSURE: 84 MMHG | HEART RATE: 72 BPM

## 2024-06-25 DIAGNOSIS — K90.9 INTESTINAL MALABSORPTION, UNSPECIFIED TYPE (HHS-HCC): ICD-10-CM

## 2024-06-25 DIAGNOSIS — E61.1 IRON DEFICIENCY: ICD-10-CM

## 2024-06-25 DIAGNOSIS — O99.013 ANEMIA AFFECTING PREGNANCY IN THIRD TRIMESTER (HHS-HCC): ICD-10-CM

## 2024-06-25 PROCEDURE — 2500000004 HC RX 250 GENERAL PHARMACY W/ HCPCS (ALT 636 FOR OP/ED): Performed by: PHYSICIAN ASSISTANT

## 2024-06-25 PROCEDURE — 96365 THER/PROPH/DIAG IV INF INIT: CPT | Mod: INF

## 2024-06-25 RX ORDER — FAMOTIDINE 10 MG/ML
20 INJECTION INTRAVENOUS ONCE AS NEEDED
Status: DISCONTINUED | OUTPATIENT
Start: 2024-06-25 | End: 2024-06-25 | Stop reason: HOSPADM

## 2024-06-25 RX ORDER — EPINEPHRINE 0.3 MG/.3ML
0.3 INJECTION SUBCUTANEOUS EVERY 5 MIN PRN
Status: DISCONTINUED | OUTPATIENT
Start: 2024-06-25 | End: 2024-06-25 | Stop reason: HOSPADM

## 2024-06-25 RX ORDER — ALBUTEROL SULFATE 0.83 MG/ML
3 SOLUTION RESPIRATORY (INHALATION) AS NEEDED
Status: DISCONTINUED | OUTPATIENT
Start: 2024-06-25 | End: 2024-06-25 | Stop reason: HOSPADM

## 2024-06-25 RX ORDER — DIPHENHYDRAMINE HYDROCHLORIDE 50 MG/ML
50 INJECTION INTRAMUSCULAR; INTRAVENOUS AS NEEDED
OUTPATIENT
Start: 2024-06-25

## 2024-06-25 RX ORDER — FAMOTIDINE 10 MG/ML
20 INJECTION INTRAVENOUS ONCE AS NEEDED
OUTPATIENT
Start: 2024-06-25

## 2024-06-25 RX ORDER — EPINEPHRINE 0.3 MG/.3ML
0.3 INJECTION SUBCUTANEOUS EVERY 5 MIN PRN
OUTPATIENT
Start: 2024-06-25

## 2024-06-25 RX ORDER — DIPHENHYDRAMINE HYDROCHLORIDE 50 MG/ML
50 INJECTION INTRAMUSCULAR; INTRAVENOUS AS NEEDED
Status: DISCONTINUED | OUTPATIENT
Start: 2024-06-25 | End: 2024-06-25 | Stop reason: HOSPADM

## 2024-06-25 RX ORDER — ALBUTEROL SULFATE 0.83 MG/ML
3 SOLUTION RESPIRATORY (INHALATION) AS NEEDED
OUTPATIENT
Start: 2024-06-25

## 2024-06-25 ASSESSMENT — PAIN SCALES - GENERAL: PAINLEVEL: 0-NO PAIN

## 2024-06-25 NOTE — PROGRESS NOTES
Pt here for Venofer infusion. Assessment completed. Pt voices no new complaints. Pt tolerated infusion well and was discharged in stable condition.

## 2024-07-10 ENCOUNTER — APPOINTMENT (OUTPATIENT)
Dept: INTEGRATIVE MEDICINE | Facility: CLINIC | Age: 24
End: 2024-07-10
Payer: COMMERCIAL

## 2024-08-13 ENCOUNTER — LAB (OUTPATIENT)
Dept: LAB | Facility: LAB | Age: 24
End: 2024-08-13
Payer: COMMERCIAL

## 2024-08-13 ENCOUNTER — HOSPITAL ENCOUNTER (EMERGENCY)
Facility: HOSPITAL | Age: 24
Discharge: HOME | End: 2024-08-13
Payer: COMMERCIAL

## 2024-08-13 VITALS
WEIGHT: 195 LBS | BODY MASS INDEX: 30.61 KG/M2 | SYSTOLIC BLOOD PRESSURE: 118 MMHG | DIASTOLIC BLOOD PRESSURE: 74 MMHG | TEMPERATURE: 98.8 F | RESPIRATION RATE: 17 BRPM | HEART RATE: 76 BPM | OXYGEN SATURATION: 100 % | HEIGHT: 67 IN

## 2024-08-13 DIAGNOSIS — Z71.1 CONCERN ABOUT STD IN FEMALE WITHOUT DIAGNOSIS: ICD-10-CM

## 2024-08-13 DIAGNOSIS — N94.6 MENSTRUAL PAIN: ICD-10-CM

## 2024-08-13 DIAGNOSIS — E61.1 IRON DEFICIENCY: ICD-10-CM

## 2024-08-13 DIAGNOSIS — J34.0 CELLULITIS OF SIDEWALL OF NOSE: Primary | ICD-10-CM

## 2024-08-13 DIAGNOSIS — O99.013 ANEMIA AFFECTING PREGNANCY IN THIRD TRIMESTER (HHS-HCC): ICD-10-CM

## 2024-08-13 DIAGNOSIS — K90.9 INTESTINAL MALABSORPTION, UNSPECIFIED TYPE (HHS-HCC): ICD-10-CM

## 2024-08-13 DIAGNOSIS — D64.9 ANEMIA, UNSPECIFIED TYPE: ICD-10-CM

## 2024-08-13 LAB
APPEARANCE UR: ABNORMAL
BASOPHILS # BLD AUTO: 0.03 X10*3/UL (ref 0–0.1)
BASOPHILS NFR BLD AUTO: 0.5 %
BILIRUB UR STRIP.AUTO-MCNC: NEGATIVE MG/DL
COLOR UR: YELLOW
EOSINOPHIL # BLD AUTO: 0.12 X10*3/UL (ref 0–0.7)
EOSINOPHIL NFR BLD AUTO: 1.9 %
ERYTHROCYTE [DISTWIDTH] IN BLOOD BY AUTOMATED COUNT: 14.1 % (ref 11.5–14.5)
FERRITIN SERPL-MCNC: 217 NG/ML (ref 8–150)
FOLATE SERPL-MCNC: 5.2 NG/ML
GLUCOSE UR STRIP.AUTO-MCNC: NORMAL MG/DL
HCG UR QL IA.RAPID: NEGATIVE
HCT VFR BLD AUTO: 39.9 % (ref 36–46)
HGB BLD-MCNC: 12.1 G/DL (ref 12–16)
IMM GRANULOCYTES # BLD AUTO: 0.01 X10*3/UL (ref 0–0.7)
IMM GRANULOCYTES NFR BLD AUTO: 0.2 % (ref 0–0.9)
IRON SATN MFR SERPL: 26 % (ref 25–45)
IRON SERPL-MCNC: 73 UG/DL (ref 35–150)
KETONES UR STRIP.AUTO-MCNC: NEGATIVE MG/DL
LEUKOCYTE ESTERASE UR QL STRIP.AUTO: NEGATIVE
LYMPHOCYTES # BLD AUTO: 1.96 X10*3/UL (ref 1.2–4.8)
LYMPHOCYTES NFR BLD AUTO: 30.3 %
MCH RBC QN AUTO: 26.1 PG (ref 26–34)
MCHC RBC AUTO-ENTMCNC: 30.3 G/DL (ref 32–36)
MCV RBC AUTO: 86 FL (ref 80–100)
MONOCYTES # BLD AUTO: 0.39 X10*3/UL (ref 0.1–1)
MONOCYTES NFR BLD AUTO: 6 %
MUCOUS THREADS #/AREA URNS AUTO: NORMAL /LPF
NEUTROPHILS # BLD AUTO: 3.95 X10*3/UL (ref 1.2–7.7)
NEUTROPHILS NFR BLD AUTO: 61.1 %
NITRITE UR QL STRIP.AUTO: NEGATIVE
NRBC BLD-RTO: 0 /100 WBCS (ref 0–0)
PH UR STRIP.AUTO: 7.5 [PH]
PLATELET # BLD AUTO: 300 X10*3/UL (ref 150–450)
PROT UR STRIP.AUTO-MCNC: ABNORMAL MG/DL
RBC # BLD AUTO: 4.63 X10*6/UL (ref 4–5.2)
RBC # UR STRIP.AUTO: NEGATIVE /UL
RBC #/AREA URNS AUTO: NORMAL /HPF
SP GR UR STRIP.AUTO: 1.02
SQUAMOUS #/AREA URNS AUTO: NORMAL /HPF
TIBC SERPL-MCNC: 286 UG/DL (ref 240–445)
UIBC SERPL-MCNC: 213 UG/DL (ref 110–370)
UROBILINOGEN UR STRIP.AUTO-MCNC: NORMAL MG/DL
VIT B12 SERPL-MCNC: 728 PG/ML (ref 211–911)
WBC # BLD AUTO: 6.5 X10*3/UL (ref 4.4–11.3)
WBC #/AREA URNS AUTO: NORMAL /HPF

## 2024-08-13 PROCEDURE — 82607 VITAMIN B-12: CPT

## 2024-08-13 PROCEDURE — 87661 TRICHOMONAS VAGINALIS AMPLIF: CPT | Mod: AHULAB | Performed by: PHYSICIAN ASSISTANT

## 2024-08-13 PROCEDURE — 83540 ASSAY OF IRON: CPT

## 2024-08-13 PROCEDURE — 83550 IRON BINDING TEST: CPT

## 2024-08-13 PROCEDURE — 87491 CHLMYD TRACH DNA AMP PROBE: CPT | Mod: AHULAB | Performed by: PHYSICIAN ASSISTANT

## 2024-08-13 PROCEDURE — 85025 COMPLETE CBC W/AUTO DIFF WBC: CPT

## 2024-08-13 PROCEDURE — 99283 EMERGENCY DEPT VISIT LOW MDM: CPT

## 2024-08-13 PROCEDURE — 81001 URINALYSIS AUTO W/SCOPE: CPT | Performed by: PHYSICIAN ASSISTANT

## 2024-08-13 PROCEDURE — 36415 COLL VENOUS BLD VENIPUNCTURE: CPT

## 2024-08-13 PROCEDURE — 82728 ASSAY OF FERRITIN: CPT

## 2024-08-13 PROCEDURE — 81025 URINE PREGNANCY TEST: CPT | Performed by: PHYSICIAN ASSISTANT

## 2024-08-13 PROCEDURE — 87591 N.GONORRHOEAE DNA AMP PROB: CPT | Mod: AHULAB | Performed by: PHYSICIAN ASSISTANT

## 2024-08-13 PROCEDURE — 82746 ASSAY OF FOLIC ACID SERUM: CPT

## 2024-08-13 RX ORDER — MUPIROCIN 20 MG/G
OINTMENT TOPICAL 2 TIMES DAILY
Qty: 22 G | Refills: 0 | Status: SHIPPED | OUTPATIENT
Start: 2024-08-13 | End: 2024-08-20

## 2024-08-13 ASSESSMENT — PAIN SCALES - GENERAL
PAINLEVEL_OUTOF10: 0 - NO PAIN

## 2024-08-13 ASSESSMENT — PAIN - FUNCTIONAL ASSESSMENT
PAIN_FUNCTIONAL_ASSESSMENT: 0-10
PAIN_FUNCTIONAL_ASSESSMENT: 0-10

## 2024-08-13 ASSESSMENT — COLUMBIA-SUICIDE SEVERITY RATING SCALE - C-SSRS
6. HAVE YOU EVER DONE ANYTHING, STARTED TO DO ANYTHING, OR PREPARED TO DO ANYTHING TO END YOUR LIFE?: NO
2. HAVE YOU ACTUALLY HAD ANY THOUGHTS OF KILLING YOURSELF?: NO
1. IN THE PAST MONTH, HAVE YOU WISHED YOU WERE DEAD OR WISHED YOU COULD GO TO SLEEP AND NOT WAKE UP?: NO

## 2024-08-13 NOTE — ED PROVIDER NOTES
"HPI   Chief Complaint   Patient presents with    STD Check    Nose Piercing Concern       History of present illness: 24-year-old female has multiple complaints.  She primarily complains of swelling and discomfort in her nose ring.  Says that there is increasing crusting on the right nostril region.  Patient states that she keeps picking often excessive amount of crusting tissue.  She had the nose ring placed 2 months ago.  The crusting began about 2 weeks ago.  Denies any fevers, chills, sweats, nausea vomiting, headache, neck stiffness.    Patient also requesting STD check.  Patient states that her sexual partner \"has a girlfriend\" and she wants to make sure she did not catch any STDs.  She has no symptoms at this time.  Her sexual partner does not have any symptoms.  She does not use protection every time.  Her last menstrual period was 2 weeks ago.  Patient states she has had removal of her fallopian tubes, she still has her ovaries and uterus.  Denies dysuria polyuria vaginal bleeding vaginal discharge.    Review of systems: Constitutional, eye, ENT, cardiovascular, respiratory, gastrointestinal, genitourinary, neurologic, musculoskeletal, dermatologic, hematologic, endocrine systems were evaluated and were negative unless otherwise specified in history of present illness.    Medications: Reviewed and per nursing note.    Family history: Denies relevant medical conditions.    Past medical history: None per patient    Social history: Denies tobacco, alcohol, drug use.      Physical exam:    Appearance: Well-developed, well-nourished, nontoxic-appearing, alert and oriented x3. Talking in complete sentences.    HEENT: Crusting in right nares with minimal edema or tenderness with hoop nose ring.  Head normocephalic atraumatic, extraocular movements intact, pupils equal round reactive to light, mucous membranes are moist and pink.    NECK:  Nml Inspection, no meningismus, no thyromegaly, no lymphadenopathy, no JVD, " trachea is midline.    Respiratory: Clear to auscultation bilaterally with normal bilateral excursion. No wheezes, rhonchi, crackles.    Cardiovascular: Regular rate and rhythm, no murmurs, rubs or gallops. Pulses 2+ symmetrically in the dorsalis pedis and radial pulses.    Abdomen/GI:  Soft, nontender, nondistended, normal bowel sounds x4. No masses or organomegaly.    :  No CVA tenderness    Neuro:  Oriented x 3, Speech Clear, cranial nerves grossly intact. Normal sensation to light touch in all 4 extremities.    Musculoskeletal: Patient spontaneously moves all 4 extremities.    Skin:  No cyanosis, clubbing, edema, open wounds, or rashes.              Patient History   Past Medical History:   Diagnosis Date    Encounter for screening for infections with a predominantly sexual mode of transmission 07/17/2018    Screening for STDs (sexually transmitted diseases)    Encounter for supervision of other normal pregnancy, first trimester ([facility]) 04/10/2020    Prenatal care, subsequent pregnancy in first trimester    Encounter for surveillance of injectable contraceptive 05/06/2019    Encounter for Depo-Provera contraception    Nausea 12/11/2019    Nausea in adult    Other conditions influencing health status 04/09/2014    No significant past medical history    Other problems related to lifestyle 11/07/2018    Other problems related to lifestyle    Personal history of other diseases of the respiratory system 12/11/2019    History of sore throat    Personal history of other infectious and parasitic diseases 12/11/2019    History of viral infection    Personal history of other infectious and parasitic diseases 04/13/2017    History of Nestor-Barr virus infection    Personal history of other infectious and parasitic diseases 01/25/2018    History of trichomonal vaginitis    Personal history of other specified conditions 12/11/2019    History of abdominal pain    Personal history of other specified conditions 03/20/2017     History of abdominal pain    Persons encountering health services in other specified circumstances 08/20/2019    Encounter to establish care    Unequal limb length (acquired), unspecified site 04/24/2014    Leg length discrepancy     Past Surgical History:   Procedure Laterality Date    UMBILICAL HERNIA REPAIR  09/14/2015    Umbilical Hernia Repair     Family History   Problem Relation Name Age of Onset    Hypertension Sister      Thyroid disease Sister      Thyroid disease Maternal Grandmother      Chromosomal disorder Other Mat halfsister      Social History     Tobacco Use    Smoking status: Never     Passive exposure: Never    Smokeless tobacco: Never   Substance Use Topics    Alcohol use: Yes     Comment: rarely    Drug use: Never       Physical Exam   ED Triage Vitals [08/13/24 1846]   Temperature Heart Rate Respirations BP   37.1 °C (98.8 °F) 71 18 123/70      Pulse Ox Temp Source Heart Rate Source Patient Position   100 % Oral Monitor --      BP Location FiO2 (%)     -- --       Physical Exam      ED Course & MDM   Diagnoses as of 08/13/24 2024   Cellulitis of sidewall of nose   Concern about STD in female without diagnosis                 No data recorded     [place] Coma Scale Score: 15 (08/13/24 1846 : Paula Benites RN)                           Medical Decision Making  Labs Reviewed  URINALYSIS WITH REFLEX CULTURE AND MICROSCOPIC - Abnormal     Color, Urine                  Yellow                 Appearance, Urine             Turbid (*)               Specific Gravity, Urine       1.022                  pH, Urine                     7.5                    Protein, Urine                10 (TRACE)                Glucose, Urine                Normal                 Blood, Urine                  NEGATIVE                Ketones, Urine                NEGATIVE                Bilirubin, Urine              NEGATIVE                Urobilinogen, Urine           Normal                 Nitrite, Urine                 NEGATIVE                Leukocyte Esterase, Urine     NEGATIVE             HCG, URINE, QUALITATIVE - Normal     HCG, Urine                    NEGATIVE             URINALYSIS WITH REFLEX CULTURE AND MICROSCOPIC         Narrative: The following orders were created for panel order Urinalysis with Reflex Culture and Microscopic.                  Procedure                               Abnormality         Status                                     ---------                               -----------         ------                                     Urinalysis with Reflex C...[[id number]]  Abnormal            Final result                               Extra Urine Gray Tube[[id number]]                                                                                         Please view results for these tests on the individual orders.  C. TRACHOMATIS + N. GONORRHOEAE, AMPLIFIED  TRICH VAGINALIS, AMPLIFIED  URINALYSIS MICROSCOPIC WITH REFLEX CULTURE      Patient presents with nose pain.  Differential diagnosis of impetigo cellulitis abscess sinusitis.  Patient also has concern of STD.  She has no symptoms at this time.  Testing for gonorrhea chlamydia trichomonas.    Examination shows crusting on the right nares at the nose ring site.  Likely an impetigo or early cellulitis.  Will treat with mupirocin ointment.    Patient testing ordered for gonorrhea chlamydia trichomonas PCR urine.  She has no symptoms currently.  Does not want empirically treated at this time.  Urinalysis hCG unremarkable.    Patient will be discharged to home with prescription.  Patient is educated in signs and symptoms of worsening symptoms and reasons to come back to the emergency department.  Will need to follow up with primary care provider.  Patient does not report social determinants of health impacting ability to obtain care that is needed.  Patient agrees with plan.    This is a transcription.  Text was reviewed for errors, but some transcription  errors may remain.  Please call for any questions.          Procedure  Procedures     Lazaro Gonzalez PA-C  08/13/24 2024

## 2024-08-13 NOTE — ED TRIAGE NOTES
Patient states the person she's currently with has concerns for STD but has no symptoms. Pt is here for STD check. PT denies any sxs. Pt also reports tenderness to nose piercing

## 2024-08-14 LAB
C TRACH RRNA SPEC QL NAA+PROBE: NEGATIVE
N GONORRHOEA DNA SPEC QL PROBE+SIG AMP: NEGATIVE
T VAGINALIS RRNA SPEC QL NAA+PROBE: NEGATIVE

## 2024-08-15 ENCOUNTER — LAB (OUTPATIENT)
Dept: LAB | Facility: LAB | Age: 24
End: 2024-08-15
Payer: COMMERCIAL

## 2024-08-15 DIAGNOSIS — Z20.2 POSSIBLE EXPOSURE TO STD: ICD-10-CM

## 2024-08-15 LAB
HBV SURFACE AG SERPL QL IA: NONREACTIVE
HCV AB SER QL: NONREACTIVE
HIV 1+2 AB+HIV1 P24 AG SERPL QL IA: NONREACTIVE
TREPONEMA PALLIDUM IGG+IGM AB [PRESENCE] IN SERUM OR PLASMA BY IMMUNOASSAY: NONREACTIVE

## 2024-08-15 PROCEDURE — 87389 HIV-1 AG W/HIV-1&-2 AB AG IA: CPT

## 2024-08-15 PROCEDURE — 86803 HEPATITIS C AB TEST: CPT

## 2024-08-15 PROCEDURE — 86780 TREPONEMA PALLIDUM: CPT

## 2024-08-15 PROCEDURE — 87340 HEPATITIS B SURFACE AG IA: CPT

## 2024-08-15 PROCEDURE — 36415 COLL VENOUS BLD VENIPUNCTURE: CPT

## 2024-08-15 NOTE — PROGRESS NOTES
Hi, I was curious to know if you could put in a blood work order for STDs? I do not have any symptoms of any kind, i just want to be tested and be sure as soon as possible.     Messaged pt Orders for HIV, HEP B, Hep C, Syphillis. Denies need for urine testing, just seen and completed 8/13/24 in ED all negative. Reminded pt to schedule APE/pap for 11/2024.

## 2024-08-29 ENCOUNTER — TELEMEDICINE (OUTPATIENT)
Dept: HEMATOLOGY/ONCOLOGY | Facility: HOSPITAL | Age: 24
End: 2024-08-29
Payer: COMMERCIAL

## 2024-08-29 DIAGNOSIS — D64.9 ANEMIA, UNSPECIFIED TYPE: ICD-10-CM

## 2024-08-29 DIAGNOSIS — K90.9 INTESTINAL MALABSORPTION, UNSPECIFIED TYPE (HHS-HCC): ICD-10-CM

## 2024-08-29 DIAGNOSIS — N94.6 MENSTRUAL PAIN: ICD-10-CM

## 2024-08-29 DIAGNOSIS — E61.1 IRON DEFICIENCY: ICD-10-CM

## 2024-08-29 DIAGNOSIS — O99.013 ANEMIA AFFECTING PREGNANCY IN THIRD TRIMESTER (HHS-HCC): ICD-10-CM

## 2024-08-29 PROCEDURE — 99213 OFFICE O/P EST LOW 20 MIN: CPT | Performed by: PHYSICIAN ASSISTANT

## 2024-08-29 NOTE — PROGRESS NOTES
Patient ID: Willie Ohara is a 24 y.o. female.  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    [place] Hx:  6/15/23 - Last Visit w/ H. ELIS Urias  Location: [facility]  Initial consult: 6/15/23   Reason: iron def anemia     Patient is a 22 yo female with a PMH of obesity, nasrin and was referred to benign hematology for consultation of iron deficiency anemia. Referred by Dr. Melo, OB/GYN.     Today, patient presents for initial consultation. +fatigue, pica - chew ice, low energy.  Ferrous sulfate 3times a day for a few months w/o improvement in counts. Stool softener. Nausea, upset stomach with oral iron and constipation. Was vomiting unless took with food.  4 yrs ago - had IV iron while pregnant as could not absorb oral iron. 2yrs ago, with 2nd pregnancy, no issues and did not require oral or iv iron.  2023 and no heavy bleeding with it  Has  periods regularly and are not heavy   GI issues since gallbladder removed 2021. Digestive enzymes supplement - takes before eating and is helping.   To start Wegovy for weight loss.      Denies abnormal weight loss, night sweats, fever, sob, cp, n/v/d, n/t, edema. Denies any abnormal bleeding or bruising. No recurrent infections or lymphadenopathy. No joint/body pain. No known blood disorders in family. Has had surgery in past w/o issue.  Never had blood/blood products.     PMHx:  Active Ambulatory Problems     Diagnosis Date Noted    Anemia 03/13/2023    Anemia in pregnancy ([facility]) 03/13/2023    Back pain in pregnancy ([facility]) 03/13/2023    Breast mass in female 03/13/2023    Breast mass, right 03/13/2023    Breast pain, left 03/13/2023    Cholelithiasis 03/13/2023    Chronic right-sided low back pain without sciatica 03/13/2023    Constipation during pregnancy in first trimester ([facility]) 03/13/2023    Constipation during pregnancy in third trimester ([facility]) 03/13/2023    Family historic risk of congenital abnormality ([facility]) 03/13/2023    Nausea and vomiting during pregnancy ([facility]) 03/13/2023    Neck pain 03/13/2023    Cervicalgia 03/13/2023    Reaction to tuberculin skin test without active tuberculosis 03/13/2023    Right shoulder pain 03/13/2023    Weight gain 03/13/2023    Amenorrhea 03/13/2023    Nausea in adult 03/13/2023    Sore throat 03/13/2023    Iron deficiency 05/30/2024    Malabsorption ([facility]) 05/30/2024     Resolved Ambulatory Problems     Diagnosis Date Noted    No Resolved Ambulatory Problems     Past Medical History:   Diagnosis Date    Encounter for screening for infections with a predominantly sexual mode of transmission 07/17/2018    Encounter for supervision of other normal pregnancy, first trimester ([facility]) 04/10/2020    Encounter for surveillance of injectable contraceptive 05/06/2019    Nausea 12/11/2019    Other conditions influencing health status 04/09/2014    Other  problems related to lifestyle 11/07/2018    Personal history of other diseases of the respiratory system 12/11/2019    Personal history of other infectious and parasitic diseases 12/11/2019    Personal history of other infectious and parasitic diseases 04/13/2017    Personal history of other infectious and parasitic diseases 01/25/2018    Personal history of other specified conditions 12/11/2019    Personal history of other specified conditions 03/20/2017    Persons encountering health services in other specified circumstances 08/20/2019    Unequal limb length (acquired), unspecified site 04/24/2014     PSHx:  Past Surgical History:   Procedure Laterality Date    UMBILICAL HERNIA REPAIR  09/14/2015    Umbilical Hernia Repair      Cholecystectomy - 2021    FHx:  Family History   Problem Relation Name Age of Onset    Hypertension Sister      Thyroid disease Sister      Thyroid disease Maternal Grandmother      Chromosomal disorder Other Mat halfsister       FH: HTN, Thyroid disease, Chromosome abnormalities  Children - 2 - Denies significant medical hx    Social Hx:  Willie Ohara    reports that she has never smoked. She has never been exposed to tobacco smoke. She has never used smokeless tobacco.  She  reports current alcohol use.  She  reports no history of drug use.  Social History     Socioeconomic History    Marital status: Single   Tobacco Use    Smoking status: Never     Passive exposure: Never    Smokeless tobacco: Never   Substance and Sexual Activity    Alcohol use: Yes     Comment: rarely    Drug use: Never      Living Situation: Lives at home w/ family  Alcohol Use: Occasionally  Smoking: Denies  Recreational Drug Use: Denies  Special Diets: Regular    Medications and allergies reviewed in EMR.    ROS:  Review of Systems - Oncology   10 point review of systems negative except as state in HPI.    Vitals & Statistics:  Objective   BSA: There is no height or weight on file to calculate BSA.  There were no  vitals taken for this visit.    Physical Exam:  Physical Exam  Vitals and nursing note reviewed.   Constitutional:       Appearance: Normal appearance. She is normal weight.   HENT:      Head: Normocephalic and atraumatic.      Right Ear: External ear normal.      Left Ear: External ear normal.      Nose: Nose normal.      Mouth/Throat:      Mouth: Mucous membranes are moist.      Pharynx: Oropharynx is clear.   Eyes:      Extraocular Movements: Extraocular movements intact.      Conjunctiva/sclera: Conjunctivae normal.      Pupils: Pupils are equal, round, and reactive to light.   Cardiovascular:      Rate and Rhythm: Normal rate and regular rhythm.      Pulses: Normal pulses.      Heart sounds: Normal heart sounds.   Pulmonary:      Effort: Pulmonary effort is normal.      Breath sounds: Normal breath sounds.   Abdominal:      General: Abdomen is flat. Bowel sounds are normal.      Palpations: Abdomen is soft.      Comments: No masses or organomegaly palpable during exam   Musculoskeletal:         General: Normal range of motion.      Cervical back: Normal range of motion.   Lymphadenopathy:      Comments: No lymphadenopathy palpable   Skin:     General: Skin is warm and dry.      Capillary Refill: Capillary refill takes less than 2 seconds.   Neurological:      Mental Status: She is alert and oriented to person, place, and time. Mental status is at baseline.   Psychiatric:         Mood and Affect: Mood normal.         Behavior: Behavior normal.         Thought Content: Thought content normal.         Judgment: Judgment normal.       Results:  Lab Results   Component Value Date    WBC 6.5 08/13/2024    NEUTROABS 3.95 08/13/2024    IGABSOL 0.01 08/13/2024    LYMPHSABS 1.96 08/13/2024    MONOSABS 0.39 08/13/2024    EOSABS 0.12 08/13/2024    BASOSABS 0.03 08/13/2024    RBC 4.63 08/13/2024    MCV 86 08/13/2024    MCHC 30.3 (L) 08/13/2024    HGB 12.1 08/13/2024    HCT 39.9 08/13/2024     08/13/2024     Lab  "Results   Component Value Date    RETICCTPCT CANCELED 06/15/2023    RETICCTPCT 1.7 06/15/2023      Lab Results   Component Value Date    CREATININE 0.80 05/29/2024    BUN 7 05/29/2024    EGFR >90 05/29/2024     05/29/2024    K 3.8 05/29/2024     05/29/2024    CO2 27 05/29/2024      Lab Results   Component Value Date    ALT 7 05/29/2024    AST 11 05/29/2024    ALKPHOS 61 05/29/2024    BILITOT 0.4 05/29/2024      Lab Results   Component Value Date    TSH 1.68 08/24/2022     Lab Results   Component Value Date    TSH 1.68 08/24/2022     Lab Results   Component Value Date    IRON 73 08/13/2024    TIBC 286 08/13/2024    FERRITIN 217 (H) 08/13/2024      Lab Results   Component Value Date    QBIZHQDA05 728 08/13/2024      Lab Results   Component Value Date    FOLATE 5.2 08/13/2024     No results found for: \"ELLIOT\", \"RF\", \"SEDRATE\"   No results found for: \"CRP\"   No results found for: \"BRIANA\"  Lab Results   Component Value Date     06/15/2023     Lab Results   Component Value Date    HAPTOGLOBIN 246 (H) 06/15/2023     No results found for: \"SPEP\"  No results found for: \"IGG\", \"IGM\", \"IGA\"  Lab Results   Component Value Date    HEPBSAG Nonreactive 08/15/2024    HEPCAB Nonreactive 08/15/2024     Lab Results   Component Value Date    HIV1X2 Nonreactive 08/15/2024       Assessment:  Willie Subramanian MOE Ohara is a 24 y.o. female following up today for MILAGROS (2/2 malabsorption)    I reviewed patient's chart including but not limited to labs, imaging, surgical/procedure notes, pathology, hospital notes, doctor's notes.  8/13/24 results:  WBC count & differential WNL  NC/HC anemia w/ Hb low at 11.0 - RBC count WNL, Hct WNL, RDW elevated  Platelets WNL  Iron studies: Ferritin elevated at 217 - Iron, TIBC, %Sat all WNL - Ferritin most likely elevated 2/2 recent IV iron infusion     Plan:  MILAGROS (2/2 Malabsorption)  Referral to Dr. Fatimah Alcantar for persistently painful menses  Patient has not been scheduled since order originally placed " 5/30/24 - I reached out to Dr. Alcantar for assistance with scheduling via secure messaging  RTC in 4 months after final IV Venofer infusion via virtual/telehealth visit - F/U sooner if needed/urgent  CBC-D, CMP, Iron, B12, Folate up to 1 week prior    I had an extensive discussion with the patient regarding the diagnosis and discussed the plan of therapy, including general considerations regarding side effects and outcomes. Pt understood and gave appropriate teach back about the plan of care. All questions were answered to the patient's satisfaction. The patient is instructed to contact us at any time if questions or problems arise. Thank you for the opportunity to participate in the care of this very pleasant patient.    Total time = 30 minutes. 50% or more of this time was spent in counseling and/or coordination of care including reviewing medical history/radiology/labs, examining patient, formulating outlined plan with team, and discussing plan with patient/family.    Gabriel Sotelo PA-C

## 2024-09-04 ENCOUNTER — TELEPHONE (OUTPATIENT)
Dept: OBSTETRICS AND GYNECOLOGY | Facility: CLINIC | Age: 24
End: 2024-09-04
Payer: COMMERCIAL

## 2024-09-04 NOTE — TELEPHONE ENCOUNTER
Nurse called pt   Pt verified by name and .  Nurse calling pt to schedule appt to establish care per Gabriel Hernandez.  Nurse scheduled pt for appt 10/17/2024.  Pt has no questions at this time.

## 2024-10-17 ENCOUNTER — APPOINTMENT (OUTPATIENT)
Dept: OBSTETRICS AND GYNECOLOGY | Facility: CLINIC | Age: 24
End: 2024-10-17
Payer: COMMERCIAL

## 2024-10-17 VITALS
WEIGHT: 208 LBS | SYSTOLIC BLOOD PRESSURE: 120 MMHG | DIASTOLIC BLOOD PRESSURE: 70 MMHG | HEIGHT: 66 IN | BODY MASS INDEX: 33.43 KG/M2

## 2024-10-17 DIAGNOSIS — E61.1 IRON DEFICIENCY: ICD-10-CM

## 2024-10-17 DIAGNOSIS — D64.9 ANEMIA, UNSPECIFIED TYPE: ICD-10-CM

## 2024-10-17 DIAGNOSIS — K90.9 INTESTINAL MALABSORPTION, UNSPECIFIED TYPE (HHS-HCC): ICD-10-CM

## 2024-10-17 DIAGNOSIS — N94.6 MENSTRUAL PAIN: ICD-10-CM

## 2024-10-17 DIAGNOSIS — Z01.419 ENCOUNTER FOR ANNUAL ROUTINE GYNECOLOGICAL EXAMINATION: ICD-10-CM

## 2024-10-17 DIAGNOSIS — Z80.3 FAMILY HISTORY OF BREAST CANCER: Primary | ICD-10-CM

## 2024-10-17 DIAGNOSIS — O99.013 ANEMIA AFFECTING PREGNANCY IN THIRD TRIMESTER (HHS-HCC): ICD-10-CM

## 2024-10-17 PROCEDURE — 3008F BODY MASS INDEX DOCD: CPT | Performed by: OBSTETRICS & GYNECOLOGY

## 2024-10-17 PROCEDURE — 87591 N.GONORRHOEAE DNA AMP PROB: CPT

## 2024-10-17 PROCEDURE — 87491 CHLMYD TRACH DNA AMP PROBE: CPT

## 2024-10-17 PROCEDURE — 1036F TOBACCO NON-USER: CPT | Performed by: OBSTETRICS & GYNECOLOGY

## 2024-10-17 PROCEDURE — 87661 TRICHOMONAS VAGINALIS AMPLIF: CPT

## 2024-10-17 PROCEDURE — 99395 PREV VISIT EST AGE 18-39: CPT | Performed by: OBSTETRICS & GYNECOLOGY

## 2024-10-17 ASSESSMENT — ENCOUNTER SYMPTOMS
MUSCULOSKELETAL NEGATIVE: 0
PSYCHIATRIC NEGATIVE: 0
GASTROINTESTINAL NEGATIVE: 0
CARDIOVASCULAR NEGATIVE: 0
NEUROLOGICAL NEGATIVE: 0
RESPIRATORY NEGATIVE: 0
HEMATOLOGIC/LYMPHATIC NEGATIVE: 0
EYES NEGATIVE: 0
ENDOCRINE NEGATIVE: 0
CONSTITUTIONAL NEGATIVE: 0
ALLERGIC/IMMUNOLOGIC NEGATIVE: 0

## 2024-10-17 ASSESSMENT — PAIN SCALES - GENERAL: PAINLEVEL_OUTOF10: 0-NO PAIN

## 2024-10-17 NOTE — PROGRESS NOTES
24-year-old -0-1-2 obese -American woman presents today for annual GYN exam with normal, light menstrual cycles per the patient.  However she has been receiving iron transfusions for anemia.  The patient reports intermittent cramping/pelvic pain primarily around her menses.  She has a paternal grandmother and a paternal great aunt and a paternal cousin with breast cancer and she is concerned about her risk.    GynHx: Menarche began at age 7.  She has monthly cycles.  She has a remote history of trichomonas.  She reports an abnormal Pap with colposcopy in .  She received the HPV vaccine.  She denies any history of sexual abuse.  She is sexually active with 1 male partner and had bilateral salpingectomy.    OBHx:  x2. EAB x1.     Subjective   Patient ID: Willie Ohara is a 24 y.o. female who presents for Annual Exam.  HPI    Review of Systems    Objective   Physical Exam  Exam conducted with a chaperone present.   HENT:      Head: Normocephalic.      Right Ear: External ear normal.      Left Ear: External ear normal.      Nose: Nose normal.      Mouth/Throat:      Mouth: Mucous membranes are moist.   Cardiovascular:      Rate and Rhythm: Normal rate and regular rhythm.      Heart sounds: Normal heart sounds.   Pulmonary:      Effort: Pulmonary effort is normal.      Breath sounds: Normal breath sounds.   Chest:   Breasts:     Right: Normal.      Left: Normal.   Abdominal:      Palpations: Abdomen is soft.   Genitourinary:     General: Normal vulva.      Labia:         Right: No rash or lesion.         Left: No rash.       Vagina: Normal.      Cervix: Normal. No cervical motion tenderness or lesion.      Uterus: Enlarged.       Adnexa: Right adnexa normal and left adnexa normal.      Comments: Slightly enlarged.   Musculoskeletal:         General: Normal range of motion.      Cervical back: Normal range of motion and neck supple.   Skin:     General: Skin is warm.   Neurological:      General: No  focal deficit present.      Mental Status: She is alert and oriented to person, place, and time.         A/P: APE     -  Pap sent    -  US for dysmenorrhea    -  Genetic referral for Fhx of CA     -  F/U with PCP, etc     -  Routine GC and CT

## 2024-10-17 NOTE — PATIENT INSTRUCTIONS
Thanks for coming in today for your annual GYN exam.      A Pap smear was sent.  Results should be available in the next few weeks.      Arrange to have an ultrasound performed to help evaluate your intermittent pelvic cramping/pain with your menses.      Follow-up with your PCP and other healthcare specialist as needed.      Feel free to call the office with any problems, questions or concerns prior to your next scheduled visit.      Follow-up with one of our genetics counselors about your family history of breast cancer.

## 2024-10-31 LAB
CYTOLOGY CMNT CVX/VAG CYTO-IMP: NORMAL
HPV HR GENOTYPES PNL CVX NAA+PROBE: NEGATIVE
LAB AP HPV GENOTYPE QUESTION: NO
LAB AP HPV HR: NORMAL
LAB AP PAP ADDITIONAL TESTS: NORMAL
LABORATORY COMMENT REPORT: NORMAL
PATH REPORT.TOTAL CANCER: NORMAL

## 2024-11-04 DIAGNOSIS — Z20.2 ENCOUNTER FOR ASSESSMENT OF STD EXPOSURE: ICD-10-CM

## 2024-11-08 ENCOUNTER — LAB (OUTPATIENT)
Dept: LAB | Facility: LAB | Age: 24
End: 2024-11-08
Payer: COMMERCIAL

## 2024-11-08 DIAGNOSIS — Z20.2 ENCOUNTER FOR ASSESSMENT OF STD EXPOSURE: ICD-10-CM

## 2024-12-30 ENCOUNTER — HOSPITAL ENCOUNTER (EMERGENCY)
Facility: HOSPITAL | Age: 24
Discharge: HOME | End: 2024-12-30
Payer: COMMERCIAL

## 2024-12-30 VITALS
SYSTOLIC BLOOD PRESSURE: 127 MMHG | RESPIRATION RATE: 16 BRPM | OXYGEN SATURATION: 99 % | TEMPERATURE: 98.1 F | HEART RATE: 65 BPM | DIASTOLIC BLOOD PRESSURE: 71 MMHG

## 2024-12-30 DIAGNOSIS — R21 RASH: Primary | ICD-10-CM

## 2024-12-30 PROCEDURE — 99283 EMERGENCY DEPT VISIT LOW MDM: CPT

## 2024-12-30 PROCEDURE — 99284 EMERGENCY DEPT VISIT MOD MDM: CPT | Performed by: PHYSICIAN ASSISTANT

## 2024-12-30 RX ORDER — HYDROXYZINE HYDROCHLORIDE 25 MG/1
25 TABLET, FILM COATED ORAL EVERY 6 HOURS PRN
Qty: 12 TABLET | Refills: 0 | Status: SHIPPED | OUTPATIENT
Start: 2024-12-30 | End: 2025-01-02

## 2024-12-30 RX ORDER — HYDROCORTISONE 25 MG/ML
LOTION TOPICAL 2 TIMES DAILY
Qty: 59 ML | Refills: 0 | Status: SHIPPED | OUTPATIENT
Start: 2024-12-30 | End: 2025-01-09

## 2024-12-30 NOTE — ED PROVIDER NOTES
Emergency Department Encounter  [facility] EMERGENCY MEDICINE    Patient: Willie Ohara  MRN: [id number]  : 2000  Date of Evaluation: 2024  ED Provider: Dora Bingham PA-C      Chief Complaint       Chief Complaint   Patient presents with    Rash     HPI    Willie Ohara is a 24 y.o. female who presents to the emergency department presenting for rash to right chest over the past 2 days.  Patient states initially she had a small area of bumps to the front of her neck, that spontaneously resolved and recurred 2 days later to her right breast.  Rash has not evolved since onset.  Is very itchy.  No rash to her mouth, genitals, palms or soles.  No recent changes in soaps, lotions, detergents other topicals.  No one else at home has a similar appearing rash.  Denies any bleeding or oozing from the site.  Has not attempted any interventions for her symptoms prior to arrival.  No fevers or chills, reports cough, abdominal pain, nausea vomiting, diarrhea, new numbness or tingling, weakness.    ROS:     Review of Systems  14 systems reviewed and otherwise acutely negative except as in the HPI.    Past History     Past Medical History:   Diagnosis Date    Encounter for screening for infections with a predominantly sexual mode of transmission 2018    Screening for STDs (sexually transmitted diseases)    Encounter for supervision of other normal pregnancy, first trimester 04/10/2020    Prenatal care, subsequent pregnancy in first trimester    Encounter for surveillance of injectable contraceptive 2019    Encounter for Depo-Provera contraception    Nausea 2019    Nausea in adult    Other conditions influencing health status 2014    No significant past medical history    Other problems related to lifestyle 2018    Other problems related to lifestyle    Personal history of other diseases of the respiratory system 2019    History of sore throat    Personal  PFT and 6 minute walk   If PFT is normal will do methacholine  Chest xray    history of other infectious and parasitic diseases 2019    History of viral infection    Personal history of other infectious and parasitic diseases 2017    History of Nestor-Barr virus infection    Personal history of other infectious and parasitic diseases 2018    History of trichomonal vaginitis    Personal history of other specified conditions 2019    History of abdominal pain    Personal history of other specified conditions 2017    History of abdominal pain    Persons encountering health services in other specified circumstances 2019    Encounter to establish care    Unequal limb length (acquired), unspecified site 2014    Leg length discrepancy     Past Surgical History:   Procedure Laterality Date    CHOLECYSTECTOMY      INDUCED       SALPINGECTOMY Bilateral     for contraception    UMBILICAL HERNIA REPAIR  2015    Umbilical Hernia Repair     Social History     Socioeconomic History    Marital status: Significant Other   Occupational History    Occupation: Dental Assistant, , [employer]   Tobacco Use    Smoking status: Never     Passive exposure: Never    Smokeless tobacco: Never   Substance and Sexual Activity    Alcohol use: Yes     Comment: rarely    Drug use: Never       Medications/Allergies     Previous Medications    No medications on file     Allergies   Allergen Reactions    Latex Itching        Physical Exam       ED Triage Vitals [24 1030]   Temperature Heart Rate Respirations BP   36.7 °C (98.1 °F) 65 16 127/71      Pulse Ox Temp Source Heart Rate Source Patient Position   99 % Oral -- --      BP Location FiO2 (%)     -- --         Physical Exam    Physical Exam:     VS: As documented in the triage note and EMR flowsheet from this visit were reviewed.    Appearance: Alert, oriented, cooperative, in no acute distress. Well nourished & well hydrated.    Skin: Warm, intact and dry. Small, well circumscribed area to R chest, approx  2cm. Some surrounding erythema, edema, no increased warmth.  Nontender.  No blanching.  No active exudates, bleeding.    Neck: Supple, without meningismus. No lymphadenopathy.     Pulmonary: Clear bilaterally with good chest wall excursion. No rales, rhonchi or wheezing. No accessory muscle use or stridor.    Cardiac: Normal S1, S2.    Musculoskeletal: Spontaneously moving all extremities without limitation. Extremities warm and well-perfused, capillary refill less than 2 seconds..    Neurological:  Cranial nerves II through XII are grossly intact, normal sensation, no weakness, no focal findings identified. Ambulating without assistance with steady gait, non-ataxic.    Psychiatric: Appropriate mood and affect. Kempt appearance.      Diagnostics   Not applicable    ED Course   Visit Vitals  /71   Pulse 65   Temp 36.7 °C (98.1 °F) (Oral)   Resp 16   SpO2 99%   OB Status Having periods   Smoking Status Never     Medications - No data to display    Medical Decision Making     Diagnoses as of 12/30/24 1049   Rash   Rash spares palms, soles, mouth, genitals. Low c/f STD. No known exposures. No PMH HSV. Rx for topical hydrocortisone cream, atarax for itching. Referred to derm as needed. Please call [phone] for Primary Care referral for follow up appointments.  Instructed to return to ED if rash does spread to palms, soles, mouth, genitals - will need re-evaluation.      Final Impression      1. Rash          DISPOSITION  Disposition: discharge  Patient condition is: Stable    Comment: Please note this report has been produced using speech recognition software and may contain errors related to that system including errors in grammar, punctuation, and spelling, as well as words and phrases that may be inappropriate.  If there are any questions or concerns please feel free to contact the dictating provider for clarification.    KARMA Silver PA-C  12/30/24 1050

## 2024-12-30 NOTE — ED TRIAGE NOTES
TWO SMALL SPOTS ON RIGHT CHEST/RIGHT BREAST THAT BEGAN APPROX 3 DAYS AGO. REPORTS ITCHING AND IRRITATION.

## 2025-01-10 ENCOUNTER — LAB (OUTPATIENT)
Dept: LAB | Facility: LAB | Age: 25
End: 2025-01-10
Payer: COMMERCIAL

## 2025-01-10 DIAGNOSIS — D64.9 ANEMIA, UNSPECIFIED TYPE: ICD-10-CM

## 2025-01-10 DIAGNOSIS — O99.013 ANEMIA AFFECTING PREGNANCY IN THIRD TRIMESTER (HHS-HCC): ICD-10-CM

## 2025-01-10 DIAGNOSIS — E61.1 IRON DEFICIENCY: ICD-10-CM

## 2025-01-10 LAB
ALBUMIN SERPL BCP-MCNC: 3.9 G/DL (ref 3.4–5)
ALP SERPL-CCNC: 65 U/L (ref 33–110)
ALT SERPL W P-5'-P-CCNC: 6 U/L (ref 7–45)
ANION GAP SERPL CALC-SCNC: 10 MMOL/L (ref 10–20)
AST SERPL W P-5'-P-CCNC: 12 U/L (ref 9–39)
BASOPHILS # BLD AUTO: 0.02 X10*3/UL (ref 0–0.1)
BASOPHILS NFR BLD AUTO: 0.3 %
BILIRUB SERPL-MCNC: 0.4 MG/DL (ref 0–1.2)
BUN SERPL-MCNC: 5 MG/DL (ref 6–23)
CALCIUM SERPL-MCNC: 9.1 MG/DL (ref 8.6–10.6)
CHLORIDE SERPL-SCNC: 106 MMOL/L (ref 98–107)
CO2 SERPL-SCNC: 29 MMOL/L (ref 21–32)
CREAT SERPL-MCNC: 0.8 MG/DL (ref 0.5–1.05)
EGFRCR SERPLBLD CKD-EPI 2021: >90 ML/MIN/1.73M*2
EOSINOPHIL # BLD AUTO: 0.22 X10*3/UL (ref 0–0.7)
EOSINOPHIL NFR BLD AUTO: 3.7 %
ERYTHROCYTE [DISTWIDTH] IN BLOOD BY AUTOMATED COUNT: 12.8 % (ref 11.5–14.5)
FERRITIN SERPL-MCNC: 142 NG/ML (ref 8–150)
FOLATE SERPL-MCNC: 5.7 NG/ML
GLUCOSE SERPL-MCNC: 94 MG/DL (ref 74–99)
HCT VFR BLD AUTO: 36.6 % (ref 36–46)
HGB BLD-MCNC: 11.5 G/DL (ref 12–16)
IMM GRANULOCYTES # BLD AUTO: 0.01 X10*3/UL (ref 0–0.7)
IMM GRANULOCYTES NFR BLD AUTO: 0.2 % (ref 0–0.9)
IRON SATN MFR SERPL: 21 % (ref 25–45)
IRON SERPL-MCNC: 53 UG/DL (ref 35–150)
LYMPHOCYTES # BLD AUTO: 1.77 X10*3/UL (ref 1.2–4.8)
LYMPHOCYTES NFR BLD AUTO: 29.7 %
MCH RBC QN AUTO: 26.9 PG (ref 26–34)
MCHC RBC AUTO-ENTMCNC: 31.4 G/DL (ref 32–36)
MCV RBC AUTO: 86 FL (ref 80–100)
MONOCYTES # BLD AUTO: 0.31 X10*3/UL (ref 0.1–1)
MONOCYTES NFR BLD AUTO: 5.2 %
NEUTROPHILS # BLD AUTO: 3.63 X10*3/UL (ref 1.2–7.7)
NEUTROPHILS NFR BLD AUTO: 60.9 %
NRBC BLD-RTO: 0 /100 WBCS (ref 0–0)
PLATELET # BLD AUTO: 279 X10*3/UL (ref 150–450)
POTASSIUM SERPL-SCNC: 4 MMOL/L (ref 3.5–5.3)
PROT SERPL-MCNC: 7 G/DL (ref 6.4–8.2)
RBC # BLD AUTO: 4.28 X10*6/UL (ref 4–5.2)
SODIUM SERPL-SCNC: 141 MMOL/L (ref 136–145)
TIBC SERPL-MCNC: 256 UG/DL (ref 240–445)
UIBC SERPL-MCNC: 203 UG/DL (ref 110–370)
VIT B12 SERPL-MCNC: 564 PG/ML (ref 211–911)
WBC # BLD AUTO: 6 X10*3/UL (ref 4.4–11.3)

## 2025-01-10 PROCEDURE — 80053 COMPREHEN METABOLIC PANEL: CPT

## 2025-01-10 PROCEDURE — 83540 ASSAY OF IRON: CPT

## 2025-01-10 PROCEDURE — 82728 ASSAY OF FERRITIN: CPT

## 2025-01-10 PROCEDURE — 82607 VITAMIN B-12: CPT

## 2025-01-10 PROCEDURE — 82746 ASSAY OF FOLIC ACID SERUM: CPT

## 2025-01-10 PROCEDURE — 83550 IRON BINDING TEST: CPT

## 2025-01-10 PROCEDURE — 85025 COMPLETE CBC W/AUTO DIFF WBC: CPT

## 2025-01-13 ENCOUNTER — TELEMEDICINE (OUTPATIENT)
Dept: HEMATOLOGY/ONCOLOGY | Facility: CLINIC | Age: 25
End: 2025-01-13
Payer: COMMERCIAL

## 2025-01-13 DIAGNOSIS — D64.9 ANEMIA, UNSPECIFIED TYPE: ICD-10-CM

## 2025-01-13 DIAGNOSIS — O99.013 ANEMIA AFFECTING PREGNANCY IN THIRD TRIMESTER (HHS-HCC): ICD-10-CM

## 2025-01-13 DIAGNOSIS — E61.1 IRON DEFICIENCY: ICD-10-CM

## 2025-01-13 NOTE — PROGRESS NOTES
Patient ID: Willie Ohara is a 24 y.o. female.  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 today. 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 Patient also advised that vociemail would be left after multiple attempts to reach patient. 2 attempts were made to reach patient at the available numbers in patient's EMR. Attempts were made at 10:59am and 11:00am. Per previous visit, patient provided this provider with permission to leave message on voicemail discussing results and plan of care. Voicemail left on patient's voicemail detailing results and plan of care. Left callback information with instructions to contact this clinic with any questions or concerns. Also submitted [employer] message to patient.      [facility] Hx:  6/15/23 - Last Visit w/ H. ELIS Urias  Location: [facility]  Initial consult: 6/15/23   Reason: iron def anemia     Patient is a 22 yo female with a PMH of obesity, nasrin and was referred to benign hematology for consultation of iron deficiency anemia. Referred by Dr. Melo,  OB/GYN.     Today, patient presents for initial consultation. +fatigue, pica - chew ice, low energy.  Ferrous sulfate 3times a day for a few months w/o improvement in counts. Stool softener. Nausea, upset stomach with oral iron and constipation. Was vomiting unless took with food.  4 yrs ago - had IV iron while pregnant as could not absorb oral iron. 2yrs ago, with 2nd pregnancy, no issues and did not require oral or iv iron.  2023 and no heavy bleeding with it  Has periods regularly and are not heavy   GI issues since gallbladder removed . Digestive enzymes supplement - takes before eating and is helping.   To start Wegovy for weight loss.      Denies abnormal weight loss, night sweats, fever, sob, cp, n/v/d, n/t, edema. Denies any abnormal bleeding or bruising. No recurrent infections or lymphadenopathy. No joint/body pain. No known blood disorders in family. Has had surgery in past w/o issue.  Never had blood/blood products.     PMHx:  Active Ambulatory Problems     Diagnosis Date Noted    Anemia 2023    Anemia in pregnancy ([facility]) 2023    Back pain in pregnancy ([facility]) 2023    Breast mass in female 2023    Breast mass, right 2023    Breast pain, left 2023    Cholelithiasis 2023    Chronic right-sided low back pain without sciatica 2023    Constipation during pregnancy in first trimester ([facility]) 2023    Constipation during pregnancy in third trimester ([facility]) 2023    Family historic risk of congenital abnormality ([facility]) 2023    Nausea and vomiting during pregnancy 2023    Neck pain 2023    Cervicalgia 2023    Reaction to tuberculin skin test without active tuberculosis 2023    Right shoulder pain 2023    Weight gain 2023    Amenorrhea 2023    Nausea in adult 2023    Sore throat 2023    Iron deficiency 2024    Malabsorption ([facility]) 2024     Resolved  Ambulatory Problems     Diagnosis Date Noted    No Resolved Ambulatory Problems     Past Medical History:   Diagnosis Date    Encounter for screening for infections with a predominantly sexual mode of transmission 2018    Encounter for supervision of other normal pregnancy, first trimester 04/10/2020    Encounter for surveillance of injectable contraceptive 2019    Nausea 2019    Other conditions influencing health status 2014    Other problems related to lifestyle 2018    Personal history of other diseases of the respiratory system 2019    Personal history of other infectious and parasitic diseases 2019    Personal history of other infectious and parasitic diseases 2017    Personal history of other infectious and parasitic diseases 2018    Personal history of other specified conditions 2019    Personal history of other specified conditions 2017    Persons encountering health services in other specified circumstances 2019    Unequal limb length (acquired), unspecified site 2014     PSHx:  Past Surgical History:   Procedure Laterality Date    CHOLECYSTECTOMY      INDUCED       SALPINGECTOMY Bilateral     for contraception    UMBILICAL HERNIA REPAIR  2015    Umbilical Hernia Repair      Cholecystectomy -     FHx:  Family History   Problem Relation Name Age of Onset    Hypertension Sister      Thyroid disease Sister      Thyroid disease Maternal Grandmother      Chromosomal disorder Other Mat halfsister       FH: HTN, Thyroid disease, Chromosome abnormalities  Children - 2 - Denies significant medical hx    Social Hx:  Willie Subramanian MOE Ohara    reports that she has never smoked. She has never been exposed to tobacco smoke. She has never used smokeless tobacco.  She  reports current alcohol use.  She  reports no history of drug use.  Social History     Socioeconomic History    Marital status: Significant Other   Occupational History  "   Occupation: Dental Assistant, , [employer]   Tobacco Use    Smoking status: Never     Passive exposure: Never    Smokeless tobacco: Never   Substance and Sexual Activity    Alcohol use: Yes     Comment: rarely    Drug use: Never      Living Situation: Lives at home w/ family  Alcohol Use: Occasionally  Smoking: Denies  Recreational Drug Use: Denies  Special Diets: Regular    Medications and allergies reviewed in EMR.    ROS:  Review of Systems - Oncology   10 point review of systems negative except as state in HPI.    Vitals & Statistics:  Objective   BSA: There is no height or weight on file to calculate BSA.  There were no vitals taken for this visit.    Physical Exam:  Physical Exam  N/A - Virtual visit    Results:  Lab Results   Component Value Date    WBC 6.0 01/10/2025    NEUTROABS 3.63 01/10/2025    IGABSOL 0.01 01/10/2025    LYMPHSABS 1.77 01/10/2025    MONOSABS 0.31 01/10/2025    EOSABS 0.22 01/10/2025    BASOSABS 0.02 01/10/2025    RBC 4.28 01/10/2025    MCV 86 01/10/2025    MCHC 31.4 (L) 01/10/2025    HGB 11.5 (L) 01/10/2025    HCT 36.6 01/10/2025     01/10/2025     Lab Results   Component Value Date    RETICCTPCT CANCELED 06/15/2023    RETICCTPCT 1.7 06/15/2023      Lab Results   Component Value Date    CREATININE 0.80 01/10/2025    BUN 5 (L) 01/10/2025    EGFR >90 01/10/2025     01/10/2025    K 4.0 01/10/2025     01/10/2025    CO2 29 01/10/2025      Lab Results   Component Value Date    ALT 6 (L) 01/10/2025    AST 12 01/10/2025    ALKPHOS 65 01/10/2025    BILITOT 0.4 01/10/2025      Lab Results   Component Value Date    TSH 1.68 08/24/2022     Lab Results   Component Value Date    TSH 1.68 08/24/2022     Lab Results   Component Value Date    IRON 53 01/10/2025    TIBC 256 01/10/2025    FERRITIN 142 01/10/2025      Lab Results   Component Value Date    IWDLBYGI21 564 01/10/2025      Lab Results   Component Value Date    FOLATE 5.7 01/10/2025     No results found for: \"ELLIOT\", " "\"RF\", \"SEDRATE\"   No results found for: \"CRP\"   No results found for: \"BRIANA\"  Lab Results   Component Value Date     06/15/2023     Lab Results   Component Value Date    HAPTOGLOBIN 246 (H) 06/15/2023     No results found for: \"SPEP\"  No results found for: \"IGG\", \"IGM\", \"IGA\"  Lab Results   Component Value Date    HEPBSAG Nonreactive 11/08/2024    HEPCAB Nonreactive 11/08/2024     Lab Results   Component Value Date    HIV1X2 Nonreactive 11/08/2024       Assessment:  Willie Moris Ohara is a 24 y.o. female following up today for MILAGROS (2/2 malabsorption)    I reviewed patient's chart including but not limited to labs, imaging, surgical/procedure notes, pathology, hospital notes, doctor's notes.    1/13/25 results:  WBC count & differential WNL  NC/HC anemia w/ Hb low at 11.5 - RBC count WNL, Hct WNL, RDW elevated  Platelets WNL  Iron studies: Ferritin WNL at 142 - Iron, TIBC, %Sat all WNL  for this practice     Plan:  MILAGROS (2/2 Malabsorption)  RTC in 4 months via in-person visit - F/U sooner if needed/urgent  CBC-D, CMP, Iron, B12, Folate up to 1 week prior  Patient to transfer care to colleague in [facility] as I am transferring from [facility] to [facility] effective 4/2025    I had an extensive discussion with the patient regarding the diagnosis and discussed the plan of therapy, including general considerations regarding side effects and outcomes. Pt understood and gave appropriate teach back about the plan of care. All questions were answered to the patient's satisfaction. The patient is instructed to contact us at any time if questions or problems arise. Thank you for the opportunity to participate in the care of this very pleasant patient.    Total time = 30 minutes. 50% or more of this time was spent in counseling and/or coordination of care including reviewing medical history/radiology/labs, examining patient, formulating outlined plan with team, and discussing plan with patient/family.    Gabriel ROCHA" AKRMA Sotelo

## 2025-05-17 ENCOUNTER — OFFICE VISIT (OUTPATIENT)
Dept: URGENT CARE | Age: 25
End: 2025-05-17
Payer: COMMERCIAL

## 2025-05-17 VITALS
DIASTOLIC BLOOD PRESSURE: 74 MMHG | HEIGHT: 66 IN | TEMPERATURE: 98.4 F | HEART RATE: 77 BPM | BODY MASS INDEX: 31.34 KG/M2 | SYSTOLIC BLOOD PRESSURE: 111 MMHG | RESPIRATION RATE: 16 BRPM | WEIGHT: 195 LBS | OXYGEN SATURATION: 97 %
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
## 2025-05-17 DIAGNOSIS — S00.36XA INSECT BITE OF NOSE, INITIAL ENCOUNTER: Primary | ICD-10-CM

## 2025-05-17 RX ORDER — METHYLPREDNISOLONE 4 MG/1
TABLET ORAL
Qty: 21 TABLET | Refills: 0 | Status: SHIPPED | OUTPATIENT
Start: 2025-05-17 | End: 2025-05-23

## 2025-05-17 RX ORDER — MUPIROCIN 20 MG/G
OINTMENT TOPICAL
Qty: 22 G | Refills: 0 | Status: SHIPPED | OUTPATIENT
Start: 2025-05-17 | End: 2025-05-27

## 2025-05-17 NOTE — PROGRESS NOTES
"Subjective   Patient ID: Willie Ohara is a 25 y.o. female. They present today with a chief complaint of Insect Bite (Pt. Believes she was bit by a spider/insect at the tip of her nose. Symptoms swelling, redness and drainage. Yesterday am.).states that it is pruritic and she used hot and cold compression w/o relief.       Past Medical History  Allergies as of 05/17/2025 - Reviewed 05/17/2025   Allergen Reaction Noted    Latex Itching 03/13/2023       Prescriptions Prior to Admission[1]     Medical History[2]    Surgical History[3]     reports that she has been smoking cigarettes and cigars. She has never been exposed to tobacco smoke. She has never used smokeless tobacco. She reports that she does not currently use alcohol. She reports that she does not use drugs.    Review of Systems  Review of Systems                               Objective    Vitals:    05/17/25 1403   BP: 111/74   Pulse: 77   Resp: 16   Temp: 36.9 °C (98.4 °F)   TempSrc: Oral   SpO2: 97%   Weight: 88.5 kg (195 lb)   Height: 1.676 m (5' 6\")     No LMP recorded (lmp unknown).    Physical Exam  Vitals reviewed.   Constitutional:       General: She is not in acute distress.  HENT:      Head: Normocephalic and atraumatic.      Nose: Nose normal.        Comments: Erythema, drainage.      Mouth/Throat:      Mouth: Mucous membranes are moist.   Eyes:      Extraocular Movements: Extraocular movements intact.      Conjunctiva/sclera: Conjunctivae normal.   Pulmonary:      Effort: Pulmonary effort is normal.   Skin:     General: Skin is warm.   Neurological:      Mental Status: She is alert and oriented to person, place, and time.   Psychiatric:         Mood and Affect: Mood normal.         Behavior: Behavior normal.         Procedures    Point of Care Test & Imaging Results from this visit  No results found for this visit on 05/17/25.   Imaging  No results found.    Cardiology, Vascular, and Other Imaging  No other imaging results found for the past 2 " days      Diagnostic study results (if any) were reviewed by Radha Hurtado PA-C.    Assessment/Plan   Allergies, medications, history, and pertinent labs/EKGs/Imaging reviewed by Radha Hurtado PA-C.     Medical Decision Making  She will start on medrol dosepak and mupirocin for the wound as prophylaxis. Advice pt to take benadryl.  -         Patient is educated about their diagnoses.     -          Discussed medications benefits and adverse effects.     -          Answered all patient’s questions.     -          Patient will call 911 or go to the nearest ED if worsen symptoms .     -          Patient is agreeable to the plan of care and is deemed stable upon discharge.     -          Follow up with your primary care provider in two days.      Orders and Diagnoses  Diagnoses and all orders for this visit:  Insect bite of nose, initial encounter  -     methylPREDNISolone (Medrol Dospak) 4 mg tablets; Follow schedule on package instructions  -     mupirocin (Bactroban) 2 % ointment; Apply topically 3 times a day for 10 days.      Medical Admin Record      Patient disposition: Home    Electronically signed by Radha Hurtado PA-C  2:41 PM           [1] (Not in a hospital admission)   [2]   Past Medical History:  Diagnosis Date    Encounter for screening for infections with a predominantly sexual mode of transmission 07/17/2018    Screening for STDs (sexually transmitted diseases)    Encounter for supervision of other normal pregnancy, first trimester 04/10/2020    Prenatal care, subsequent pregnancy in first trimester    Encounter for surveillance of injectable contraceptive 05/06/2019    Encounter for Depo-Provera contraception    Nausea 12/11/2019    Nausea in adult    Other conditions influencing health status 04/09/2014    No significant past medical history    Other problems related to lifestyle 11/07/2018    Other problems related to lifestyle    Personal history of other diseases of the respiratory system  2019    History of sore throat    Personal history of other infectious and parasitic diseases 2019    History of viral infection    Personal history of other infectious and parasitic diseases 2017    History of Nestor-Barr virus infection    Personal history of other infectious and parasitic diseases 2018    History of trichomonal vaginitis    Personal history of other specified conditions 2019    History of abdominal pain    Personal history of other specified conditions 2017    History of abdominal pain    Persons encountering health services in other specified circumstances 2019    Encounter to establish care    Unequal limb length (acquired), unspecified site 2014    Leg length discrepancy   [3]   Past Surgical History:  Procedure Laterality Date    CHOLECYSTECTOMY      INDUCED       SALPINGECTOMY Bilateral     for contraception    UMBILICAL HERNIA REPAIR  2015    Umbilical Hernia Repair

## 2025-09-05 ENCOUNTER — TELEPHONE (OUTPATIENT)
Dept: HEMATOLOGY/ONCOLOGY | Facility: CLINIC | Age: 25
End: 2025-09-05
Payer: COMMERCIAL

## 2025-09-05 DIAGNOSIS — D64.9 ANEMIA, UNSPECIFIED TYPE: ICD-10-CM

## 2025-09-05 DIAGNOSIS — E61.1 IRON DEFICIENCY: ICD-10-CM

## 2025-09-05 DIAGNOSIS — E61.1 IRON DEFICIENCY: Primary | ICD-10-CM

## 2026-01-20 ENCOUNTER — APPOINTMENT (OUTPATIENT)
Dept: OBSTETRICS AND GYNECOLOGY | Facility: CLINIC | Age: 26
End: 2026-01-20
Payer: COMMERCIAL